# Patient Record
Sex: FEMALE | Race: WHITE | NOT HISPANIC OR LATINO
[De-identification: names, ages, dates, MRNs, and addresses within clinical notes are randomized per-mention and may not be internally consistent; named-entity substitution may affect disease eponyms.]

---

## 2017-03-01 ENCOUNTER — APPOINTMENT (OUTPATIENT)
Dept: PULMONOLOGY | Facility: CLINIC | Age: 67
End: 2017-03-01

## 2017-03-08 ENCOUNTER — APPOINTMENT (OUTPATIENT)
Dept: PULMONOLOGY | Facility: CLINIC | Age: 67
End: 2017-03-08

## 2017-03-15 ENCOUNTER — APPOINTMENT (OUTPATIENT)
Dept: PULMONOLOGY | Facility: CLINIC | Age: 67
End: 2017-03-15

## 2017-03-21 RX ORDER — BUPROPION HYDROCHLORIDE 150 MG/1
0 TABLET, EXTENDED RELEASE ORAL
Qty: 30 | Refills: 0 | COMMUNITY
Start: 2017-03-21

## 2017-03-29 ENCOUNTER — APPOINTMENT (OUTPATIENT)
Dept: PULMONOLOGY | Facility: CLINIC | Age: 67
End: 2017-03-29

## 2017-04-10 ENCOUNTER — APPOINTMENT (OUTPATIENT)
Dept: RHEUMATOLOGY | Facility: CLINIC | Age: 67
End: 2017-04-10

## 2017-04-10 ENCOUNTER — OTHER (OUTPATIENT)
Age: 67
End: 2017-04-10

## 2017-04-10 VITALS
RESPIRATION RATE: 16 BRPM | BODY MASS INDEX: 17.43 KG/M2 | TEMPERATURE: 98.4 F | HEIGHT: 68 IN | OXYGEN SATURATION: 96 % | WEIGHT: 115 LBS | HEART RATE: 86 BPM | SYSTOLIC BLOOD PRESSURE: 100 MMHG | DIASTOLIC BLOOD PRESSURE: 60 MMHG

## 2017-04-10 DIAGNOSIS — F41.9 ANXIETY DISORDER, UNSPECIFIED: ICD-10-CM

## 2017-04-10 RX ORDER — DENOSUMAB 60 MG/ML
60 INJECTION SUBCUTANEOUS
Qty: 1 | Refills: 0 | Status: COMPLETED | OUTPATIENT
Start: 2017-04-10

## 2017-04-10 RX ADMIN — DENOSUMAB 0 MG/ML: 60 INJECTION SUBCUTANEOUS at 00:00

## 2017-04-26 ENCOUNTER — OTHER (OUTPATIENT)
Age: 67
End: 2017-04-26

## 2017-04-26 DIAGNOSIS — R00.2 PALPITATIONS: ICD-10-CM

## 2017-04-26 DIAGNOSIS — Z00.00 ENCOUNTER FOR GENERAL ADULT MEDICAL EXAMINATION W/OUT ABNORMAL FINDINGS: ICD-10-CM

## 2017-05-11 RX ORDER — FLUTICASONE PROPIONATE 50 MCG
0 SPRAY, SUSPENSION NASAL
Qty: 16 | Refills: 0 | COMMUNITY
Start: 2017-05-11

## 2017-05-11 RX ORDER — FLUTICASONE PROPIONATE 50 MCG
2 SPRAY, SUSPENSION NASAL
Qty: 16 | Refills: 0 | COMMUNITY
Start: 2017-05-11

## 2017-05-18 RX ORDER — TRAZODONE HCL 50 MG
0 TABLET ORAL
Qty: 60 | Refills: 0 | COMMUNITY
Start: 2017-05-18

## 2017-05-30 RX ORDER — ALPRAZOLAM 0.25 MG
0 TABLET ORAL
Qty: 30 | Refills: 0 | COMMUNITY
Start: 2017-05-30

## 2017-05-31 RX ORDER — FLURAZEPAM HCL 15 MG
0 CAPSULE ORAL
Qty: 30 | Refills: 0 | COMMUNITY
Start: 2017-05-31

## 2017-06-11 RX ORDER — MONTELUKAST 4 MG/1
1 TABLET, CHEWABLE ORAL
Qty: 30 | Refills: 0 | COMMUNITY
Start: 2017-06-11

## 2017-06-11 RX ORDER — MONTELUKAST 4 MG/1
0 TABLET, CHEWABLE ORAL
Qty: 30 | Refills: 0 | COMMUNITY
Start: 2017-06-11

## 2017-06-12 ENCOUNTER — OTHER (OUTPATIENT)
Age: 67
End: 2017-06-12

## 2017-06-19 ENCOUNTER — INPATIENT (INPATIENT)
Facility: HOSPITAL | Age: 67
LOS: 2 days | Discharge: HOME | DRG: 93 | End: 2017-06-22
Attending: INTERNAL MEDICINE | Admitting: INTERNAL MEDICINE
Payer: MEDICARE

## 2017-06-19 VITALS
TEMPERATURE: 98 F | RESPIRATION RATE: 18 BRPM | HEART RATE: 72 BPM | SYSTOLIC BLOOD PRESSURE: 126 MMHG | OXYGEN SATURATION: 100 % | DIASTOLIC BLOOD PRESSURE: 76 MMHG

## 2017-06-19 DIAGNOSIS — R41.82 ALTERED MENTAL STATUS, UNSPECIFIED: ICD-10-CM

## 2017-06-19 LAB
APAP SERPL-MCNC: <15 UG/ML — SIGNIFICANT CHANGE UP (ref 10–30)
APPEARANCE UR: CLEAR — SIGNIFICANT CHANGE UP
APTT BLD: 30.7 SEC — SIGNIFICANT CHANGE UP (ref 27.5–37.4)
BASOPHILS # BLD AUTO: 0 K/UL — SIGNIFICANT CHANGE UP (ref 0–0.2)
BASOPHILS NFR BLD AUTO: 0.7 % — SIGNIFICANT CHANGE UP (ref 0–2)
BILIRUB UR-MCNC: NEGATIVE — SIGNIFICANT CHANGE UP
CK MB BLD-MCNC: 1.7 % — SIGNIFICANT CHANGE UP (ref 0–3.5)
CK MB CFR SERPL CALC: 1 NG/ML — SIGNIFICANT CHANGE UP (ref 0–3.8)
CK SERPL-CCNC: 60 U/L — SIGNIFICANT CHANGE UP (ref 25–170)
COLOR SPEC: YELLOW — SIGNIFICANT CHANGE UP
DIFF PNL FLD: NEGATIVE — SIGNIFICANT CHANGE UP
EOSINOPHIL # BLD AUTO: 0 K/UL — SIGNIFICANT CHANGE UP (ref 0–0.5)
EOSINOPHIL NFR BLD AUTO: 0.5 % — SIGNIFICANT CHANGE UP (ref 0–6)
ETHANOL SERPL-MCNC: SIGNIFICANT CHANGE UP MG/DL (ref 0–10)
GAS PNL BLDV: SIGNIFICANT CHANGE UP
GLUCOSE UR QL: 150
HCG SERPL-ACNC: 2.3 MIU/ML — SIGNIFICANT CHANGE UP
HCT VFR BLD CALC: 38.1 % — SIGNIFICANT CHANGE UP (ref 34.5–45)
HGB BLD-MCNC: 13.3 G/DL — SIGNIFICANT CHANGE UP (ref 11.5–15.5)
INR BLD: 1.08 RATIO — SIGNIFICANT CHANGE UP (ref 0.88–1.16)
KETONES UR-MCNC: ABNORMAL
LEUKOCYTE ESTERASE UR-ACNC: NEGATIVE — SIGNIFICANT CHANGE UP
LYMPHOCYTES # BLD AUTO: 0.7 K/UL — LOW (ref 1–3.3)
LYMPHOCYTES # BLD AUTO: 16.5 % — SIGNIFICANT CHANGE UP (ref 13–44)
MCHC RBC-ENTMCNC: 33.9 PG — SIGNIFICANT CHANGE UP (ref 27–34)
MCHC RBC-ENTMCNC: 34.9 GM/DL — SIGNIFICANT CHANGE UP (ref 32–36)
MCV RBC AUTO: 97.1 FL — SIGNIFICANT CHANGE UP (ref 80–100)
MONOCYTES # BLD AUTO: 0.3 K/UL — SIGNIFICANT CHANGE UP (ref 0–0.9)
MONOCYTES NFR BLD AUTO: 8 % — SIGNIFICANT CHANGE UP (ref 2–14)
NEUTROPHILS # BLD AUTO: 3.2 K/UL — SIGNIFICANT CHANGE UP (ref 1.8–7.4)
NEUTROPHILS NFR BLD AUTO: 74.3 % — SIGNIFICANT CHANGE UP (ref 43–77)
NITRITE UR-MCNC: NEGATIVE — SIGNIFICANT CHANGE UP
PCP SPEC-MCNC: SIGNIFICANT CHANGE UP
PH UR: 6 — SIGNIFICANT CHANGE UP (ref 5–8)
PLATELET # BLD AUTO: 113 K/UL — LOW (ref 150–400)
PROT UR-MCNC: NEGATIVE — SIGNIFICANT CHANGE UP
PROTHROM AB SERPL-ACNC: 11.7 SEC — SIGNIFICANT CHANGE UP (ref 9.8–12.7)
RBC # BLD: 3.92 M/UL — SIGNIFICANT CHANGE UP (ref 3.8–5.2)
RBC # FLD: 11.9 % — SIGNIFICANT CHANGE UP (ref 10.3–14.5)
RBC CASTS # UR COMP ASSIST: SIGNIFICANT CHANGE UP /HPF (ref 0–2)
SALICYLATES SERPL-MCNC: <2 MG/DL — LOW (ref 15–30)
SP GR SPEC: 1.01 — SIGNIFICANT CHANGE UP (ref 1.01–1.02)
TROPONIN T SERPL-MCNC: <0.01 NG/ML — SIGNIFICANT CHANGE UP (ref 0–0.06)
UROBILINOGEN FLD QL: NEGATIVE — SIGNIFICANT CHANGE UP
WBC # BLD: 4.3 K/UL — SIGNIFICANT CHANGE UP (ref 3.8–10.5)
WBC # FLD AUTO: 4.3 K/UL — SIGNIFICANT CHANGE UP (ref 3.8–10.5)

## 2017-06-19 PROCEDURE — 99291 CRITICAL CARE FIRST HOUR: CPT | Mod: GC

## 2017-06-19 PROCEDURE — 70450 CT HEAD/BRAIN W/O DYE: CPT | Mod: 26

## 2017-06-19 PROCEDURE — 99223 1ST HOSP IP/OBS HIGH 75: CPT

## 2017-06-19 RX ORDER — SODIUM CHLORIDE 9 MG/ML
1000 INJECTION INTRAMUSCULAR; INTRAVENOUS; SUBCUTANEOUS ONCE
Qty: 0 | Refills: 0 | Status: COMPLETED | OUTPATIENT
Start: 2017-06-19 | End: 2017-06-19

## 2017-06-19 RX ADMIN — Medication 1 MILLIGRAM(S): at 22:10

## 2017-06-19 RX ADMIN — SODIUM CHLORIDE 1000 MILLILITER(S): 9 INJECTION INTRAMUSCULAR; INTRAVENOUS; SUBCUTANEOUS at 21:30

## 2017-06-19 RX ADMIN — Medication 1 MILLIGRAM(S): at 21:00

## 2017-06-19 NOTE — ED PROVIDER NOTE - PROGRESS NOTE DETAILS
Spoke with Dr. Baldwin, tox, concern for OD of trazadonr and wellbutrin which can give serotonin syndrome, requests CPK and MG, offer ativan 1m IV now, can repeat dosing, admit to tele - ZR Dr Robles psych called [phone] called left massage on machine

## 2017-06-19 NOTE — ED ADULT NURSE NOTE - OBJECTIVE STATEMENT
66 year old female presented to ED via ems for ams today. Pts  found pt altered at home. following some commands, presents nonverbal, inappropriate laughing at times, and intermittent body jerking/twitching. PERRL 5 mm bilaterally, not following commands for EOM assessment, however, able to move eyes and follow HCP.  BGL obtained, EKG , and placed on cardiac monitor. Lab work performed and straight cathed for urine for urine specimen. Pt to go to CT for head CT.  denies recent fevers/chills/cough. Lungs auscultated, clear upon assessment. Does not guard when touching abdomen. No signs of pain. Skin w/d/i. 66 year old female presented to ED via ems for ams today. Pts  found pt altered at home. following some commands, presents nonverbal, inappropriate laughing at times, and intermittent body jerking/twitching. PERRL 5 mm bilaterally, not following commands for EOM assessment, however, able to move eyes and follow HCP.  BGL obtained, EKG , and placed on cardiac monitor. Lab work performed and straight cathed for urine for urine specimen. Pt to go to CT for head CT.  denies recent fevers/chills/cough. Lungs auscultated, clear upon assessment. Does not guard when touching abdomen. No signs of pain. Skin w/d/i. placed on constant observation upon getting back to room for safety due to pt trying to get out of bed.

## 2017-06-19 NOTE — ED PROVIDER NOTE - OBJECTIVE STATEMENT
66 year old female past medical history depression, who presents to the ED for AMS. Patient was found by her , who is a physician, at home about 1 hour prior to arrival. Patient was found to be altered, not answering questions, making jerking movemetns with extremities and arms. Patient had blood sugar of 50 by EMS given 1 amp D50. Patient has had no prior suicidal thoughts as per , follow up Dr. Robles psychiatry ([phone]). Takes trazadone, wellbutrin, ativan, and flurazapam. 66 year old female past medical history depression, who presents to the ED for AMS. Patient was found by her , who is a physician, at home about 1 hour prior to arrival. Patient was found to be altered, not answering questions, making jerking movements with extremities and arms. Patient had blood sugar of 50 by EMS given 1 amp D50. Patient has had no prior suicidal thoughts as per , follow up Dr. Robles psychiatry ([phone]). Takes trazadone, wellbutrin, ativan, and flurazapam. 66 year old female past medical history depression, who presents to the ED for AMS. Patient was found by her , who is a physician, at home about 1 hour prior to arrival. Patient was found to be altered, not answering questions, making jerking movements with extremities and arms. Patient had blood sugar of 50 by EMS given 1 amp D50. Patient has had no prior suicidal thoughts as per , follow up Dr. Robles psychiatry ([phone]). Takes trazadone, wellbutrin, ativan, and flurazepam.

## 2017-06-19 NOTE — ED PROVIDER NOTE - CRITICAL CARE PROVIDED
additional history taking/interpretation of diagnostic studies/direct patient care (not related to procedure)

## 2017-06-19 NOTE — ED PROVIDER NOTE - MEDICAL DECISION MAKING DETAILS
66 year old female, wife of Dr. Ayala, who presents from home today and was found altered by ,  with multiple jerking movements and low sugar by EMS. Patient has depression and anxiety. 102 year old mother who is getting Alzheimer's and patient is very upset and stressed. No other medical issues, takes trazadone, wellbutrin, ativan, flurazapam, never expressed SI or admitted for psychiatric issues in the past. She drinks alcohol occasionally but not heavy alcoholic, will obtain bloodwork,. drug screen, conversation with tox, EKG, CT head, and admission to telemetry - ZR

## 2017-06-19 NOTE — ED ADULT NURSE NOTE - PMH
<<----- Click to add NO pertinent Past Medical History No pertinent past medical history Mental health problem

## 2017-06-20 DIAGNOSIS — F05 DELIRIUM DUE TO KNOWN PHYSIOLOGICAL CONDITION: ICD-10-CM

## 2017-06-20 DIAGNOSIS — R41.82 ALTERED MENTAL STATUS, UNSPECIFIED: ICD-10-CM

## 2017-06-20 DIAGNOSIS — E87.6 HYPOKALEMIA: ICD-10-CM

## 2017-06-20 DIAGNOSIS — E16.2 HYPOGLYCEMIA, UNSPECIFIED: ICD-10-CM

## 2017-06-20 DIAGNOSIS — H26.9 UNSPECIFIED CATARACT: Chronic | ICD-10-CM

## 2017-06-20 DIAGNOSIS — F41.9 ANXIETY DISORDER, UNSPECIFIED: ICD-10-CM

## 2017-06-20 LAB
ALBUMIN SERPL ELPH-MCNC: 3.9 G/DL — SIGNIFICANT CHANGE UP (ref 3.3–5)
ALP SERPL-CCNC: 42 U/L — SIGNIFICANT CHANGE UP (ref 40–120)
ALT FLD-CCNC: 19 U/L — SIGNIFICANT CHANGE UP (ref 10–45)
ANION GAP SERPL CALC-SCNC: 15 MMOL/L — SIGNIFICANT CHANGE UP (ref 5–17)
AST SERPL-CCNC: 27 U/L — SIGNIFICANT CHANGE UP (ref 10–40)
BASOPHILS # BLD AUTO: 0 K/UL — SIGNIFICANT CHANGE UP (ref 0–0.2)
BASOPHILS NFR BLD AUTO: 0 % — SIGNIFICANT CHANGE UP (ref 0–2)
BILIRUB SERPL-MCNC: 0.3 MG/DL — SIGNIFICANT CHANGE UP (ref 0.2–1.2)
BUN SERPL-MCNC: 10 MG/DL — SIGNIFICANT CHANGE UP (ref 7–23)
CALCIUM SERPL-MCNC: 8.6 MG/DL — SIGNIFICANT CHANGE UP (ref 8.4–10.5)
CHLORIDE SERPL-SCNC: 102 MMOL/L — SIGNIFICANT CHANGE UP (ref 96–108)
CO2 SERPL-SCNC: 21 MMOL/L — LOW (ref 22–31)
CREAT SERPL-MCNC: 0.69 MG/DL — SIGNIFICANT CHANGE UP (ref 0.5–1.3)
CULTURE RESULTS: NO GROWTH — SIGNIFICANT CHANGE UP
EOSINOPHIL # BLD AUTO: 0.01 K/UL — SIGNIFICANT CHANGE UP (ref 0–0.5)
EOSINOPHIL NFR BLD AUTO: 0.1 % — SIGNIFICANT CHANGE UP (ref 0–6)
GLUCOSE SERPL-MCNC: 92 MG/DL — SIGNIFICANT CHANGE UP (ref 70–99)
HBA1C BLD-MCNC: 5.1 % — SIGNIFICANT CHANGE UP (ref 4–5.6)
HCT VFR BLD CALC: 40 % — SIGNIFICANT CHANGE UP (ref 34.5–45)
HGB BLD-MCNC: 13.5 G/DL — SIGNIFICANT CHANGE UP (ref 11.5–15.5)
IMM GRANULOCYTES NFR BLD AUTO: 0.1 % — SIGNIFICANT CHANGE UP (ref 0–1.5)
LYMPHOCYTES # BLD AUTO: 0.65 K/UL — LOW (ref 1–3.3)
LYMPHOCYTES # BLD AUTO: 7.9 % — LOW (ref 13–44)
MCHC RBC-ENTMCNC: 31.7 PG — SIGNIFICANT CHANGE UP (ref 27–34)
MCHC RBC-ENTMCNC: 33.8 GM/DL — SIGNIFICANT CHANGE UP (ref 32–36)
MCV RBC AUTO: 93.9 FL — SIGNIFICANT CHANGE UP (ref 80–100)
MONOCYTES # BLD AUTO: 0.58 K/UL — SIGNIFICANT CHANGE UP (ref 0–0.9)
MONOCYTES NFR BLD AUTO: 7 % — SIGNIFICANT CHANGE UP (ref 2–14)
NEUTROPHILS # BLD AUTO: 6.98 K/UL — SIGNIFICANT CHANGE UP (ref 1.8–7.4)
NEUTROPHILS NFR BLD AUTO: 84.9 % — HIGH (ref 43–77)
PLATELET # BLD AUTO: 139 K/UL — LOW (ref 150–400)
POTASSIUM SERPL-MCNC: 4.1 MMOL/L — SIGNIFICANT CHANGE UP (ref 3.5–5.3)
POTASSIUM SERPL-SCNC: 4.1 MMOL/L — SIGNIFICANT CHANGE UP (ref 3.5–5.3)
PROT SERPL-MCNC: 7.1 G/DL — SIGNIFICANT CHANGE UP (ref 6–8.3)
RBC # BLD: 4.26 M/UL — SIGNIFICANT CHANGE UP (ref 3.8–5.2)
RBC # FLD: 13.8 % — SIGNIFICANT CHANGE UP (ref 10.3–14.5)
SODIUM SERPL-SCNC: 138 MMOL/L — SIGNIFICANT CHANGE UP (ref 135–145)
SPECIMEN SOURCE: SIGNIFICANT CHANGE UP
TSH SERPL-MCNC: 1.17 UIU/ML — SIGNIFICANT CHANGE UP (ref 0.27–4.2)
TSH SERPL-MCNC: 2.16 UIU/ML — SIGNIFICANT CHANGE UP (ref 0.27–4.2)
WBC # BLD: 8.23 K/UL — SIGNIFICANT CHANGE UP (ref 3.8–10.5)
WBC # FLD AUTO: 8.23 K/UL — SIGNIFICANT CHANGE UP (ref 3.8–10.5)

## 2017-06-20 PROCEDURE — 93010 ELECTROCARDIOGRAM REPORT: CPT

## 2017-06-20 PROCEDURE — 99223 1ST HOSP IP/OBS HIGH 75: CPT | Mod: AI

## 2017-06-20 PROCEDURE — 99222 1ST HOSP IP/OBS MODERATE 55: CPT | Mod: GC

## 2017-06-20 PROCEDURE — 90792 PSYCH DIAG EVAL W/MED SRVCS: CPT

## 2017-06-20 PROCEDURE — 99223 1ST HOSP IP/OBS HIGH 75: CPT

## 2017-06-20 RX ORDER — BUPROPION HYDROCHLORIDE 150 MG/1
0 TABLET, EXTENDED RELEASE ORAL
Qty: 0 | Refills: 0 | COMMUNITY

## 2017-06-20 RX ORDER — SODIUM CHLORIDE 9 MG/ML
1000 INJECTION INTRAMUSCULAR; INTRAVENOUS; SUBCUTANEOUS
Qty: 0 | Refills: 0 | Status: COMPLETED | OUTPATIENT
Start: 2017-06-20 | End: 2017-06-20

## 2017-06-20 RX ORDER — POTASSIUM CHLORIDE 20 MEQ
10 PACKET (EA) ORAL
Qty: 0 | Refills: 0 | Status: COMPLETED | OUTPATIENT
Start: 2017-06-20 | End: 2017-06-20

## 2017-06-20 RX ORDER — TRAZODONE HCL 50 MG
0 TABLET ORAL
Qty: 0 | Refills: 0 | COMMUNITY

## 2017-06-20 RX ADMIN — SODIUM CHLORIDE 100 MILLILITER(S): 9 INJECTION INTRAMUSCULAR; INTRAVENOUS; SUBCUTANEOUS at 03:47

## 2017-06-20 RX ADMIN — Medication 1 MILLIGRAM(S): at 23:27

## 2017-06-20 RX ADMIN — Medication 100 MILLIEQUIVALENT(S): at 05:10

## 2017-06-20 RX ADMIN — Medication 100 MILLIEQUIVALENT(S): at 06:29

## 2017-06-20 RX ADMIN — Medication 100 MILLIEQUIVALENT(S): at 03:47

## 2017-06-20 NOTE — CONSULT NOTE ADULT - SUBJECTIVE AND OBJECTIVE BOX
Neurology Consult Note    Patient is a 66y old  Female who presents with a chief complaint of AMS x one day (20 Jun 2017 02:41)      The patient is a 66y Female with a history of ------ asked to evaluate the patient with a complaint of ----- symptoms began ----     MEDICATIONS  (STANDING):    MEDICATIONS  (PRN):  LORazepam     Tablet 1milliGRAM(s) Oral every 2 hours PRN Agitation      PAST MEDICAL & SURGICAL HISTORY:  Osteoporosis  Anxiety  Mental health problem  No pertinent past medical history  Acquired cataract  No significant past surgical history      FAMILY HISTORY:  Family history of hypertension (Father)      Allergies    No Known Allergies    Intolerances        SOCIAL HISTORY:  type ~  then ?H&P    REVIEW OF SYSTEMS  General:(-),Skin/Breast:(-),Ophthalmologic:(-),ENMT:(-),Respiratory and Thorax:(-),Cardiovascular:(-),Gastrointestinal:(-),Genitourinary:(-),Musculoskeletal:(-),Psychiatric:(-),Hematology/Lymphatics:(-),Endocrine:(-),Allergic/Immunologic:(-)    Vital Signs Last 24 Hrs  T(C): 36.5, Max: 36.5 (06-20 @ 07:15)  T(F): 97.7, Max: 97.7 (06-20 @ 07:15)  HR: 79 (72 - 79)  BP: 123/76 (123/76 - 139/77)  BP(mean): --  RR: 18 (16 - 18)  SpO2: 95% (95% - 100%)    Physical exam  GENERAL-WDWN  CARDIOVASCULAR- Cor- RRR s MGR, carotid- 2+, No bruits  EYES- non-icteric disks obscured  MENTAL STATUS- Alert, attends, fluent, non-dysarthric, follows commands, oriented, good memory and affect.  CRANIAL NERVES-II Optic - Bilateral - Normal Visual Fields. III-IV-VI EOMI & Pupils - Bilateral - Normal Bilaterally. V Trigeminal - Bilateral - Normal bilateral facial sensation and jaw movement. VII Facial - Normal bilateral facial movement. VIII Acoustic - Bilateral - Hearing normal to voice bilaterally. IX Glossopharyngeal / X Vagus - Normal palate elevates symmetrically. XI Accessory - Normal Bilaterally. XII Hypoglossal - Tongue protrudes midline and moves symmetrically.  MOTOR-Bulk and Contour - Normal. Tone - Normal tone, no abnormal movements. Strength - 5/5 normal muscle strength - Strength full throughout.  SENSORY-Normal - LT, DSS, Vibration.  REFLEXES- DTR's 2+ throughout.  COORDINATION-Normal FFM, XIOMARA, FNF - bilaterally.  GAIT-Normal base, heel, toe, tandem.    CBC Full  -  ( 20 Jun 2017 07:24 )  WBC Count : 8.23 K/uL  Hemoglobin : 13.5 g/dL  Hematocrit : 40.0 %  Platelet Count - Automated : 139 K/uL  Mean Cell Volume : 93.9 fl  Mean Cell Hemoglobin : 31.7 pg  Mean Cell Hemoglobin Concentration : 33.8 gm/dL  Auto Neutrophil # : 6.98 K/uL  Auto Lymphocyte # : 0.65 K/uL  Auto Monocyte # : 0.58 K/uL  Auto Eosinophil # : 0.01 K/uL  Auto Basophil # : 0.00 K/uL  Auto Neutrophil % : 84.9 %  Auto Lymphocyte % : 7.9 %  Auto Monocyte % : 7.0 %  Auto Eosinophil % : 0.1 %  Auto Basophil % : 0.0 %      06-20    138  |  102  |  10  ----------------------------<  92  4.1   |  21<L>  |  0.69    Ca    8.6      20 Jun 2017 07:24  Phos  2.4     06-19  Mg     2.1     06-19    TPro  7.1  /  Alb  3.9  /  TBili  0.3  /  DBili  x   /  AST  27  /  ALT  19  /  AlkPhos  42  06-20    LIVER FUNCTIONS - ( 20 Jun 2017 07:24 )  Alb: 3.9 g/dL / Pro: 7.1 g/dL / ALK PHOS: 42 U/L / ALT: 19 U/L / AST: 27 U/L / GGT: x               CTbr-No acute intracranial hemorrhage, mass effect, or CT evidence of an acute   vascular territorial infarct.    Minimal chronic ischemic changes in thefrontoparietal white matter. Neurology Consult Note    Patient is a 66y old  Female who presents with a chief complaint of AMS x one day (20 Jun 2017 02:41)      The patient is a 66y Female with a history of -psych ilness asked to evaluate the patient with a complaint of  confusion symptoms began -1-2 days ago    MEDICATIONS  (STANDING):    MEDICATIONS  (PRN):  LORazepam     Tablet 1milliGRAM(s) Oral every 2 hours PRN Agitation      PAST MEDICAL & SURGICAL HISTORY:  Osteoporosis  Anxiety  Mental health problem  No pertinent past medical history  Acquired cataract  No significant past surgical history      FAMILY HISTORY:  Family history of hypertension (Father)      Allergies    No Known Allergies    Intolerances        SOCIAL HISTORY:  type ~  then ?H&P    REVIEW OF SYSTEMS  General:(-),Skin/Breast:(-),Ophthalmologic:(-),ENMT:(-),Respiratory and Thorax:(-),Cardiovascular:(-),Gastrointestinal:(-),Genitourinary:(-),Musculoskeletal:(-),Psychiatric:(-),Hematology/Lymphatics:(-),Endocrine:(-),Allergic/Immunologic:(-)    Vital Signs Last 24 Hrs  T(C): 36.5, Max: 36.5 (06-20 @ 07:15)  T(F): 97.7, Max: 97.7 (06-20 @ 07:15)  HR: 79 (72 - 79)  BP: 123/76 (123/76 - 139/77)  BP(mean): --  RR: 18 (16 - 18)  SpO2: 95% (95% - 100%)    Physical exam  GENERAL-WDWN  CARDIOVASCULAR- Cor- RRR s MGR, carotid- 2+, No bruits  EYES- non-icteric disks obscured  MENTAL STATUS- Alert, attends, fluent, non-dysarthric, follows commands, oriented, good memory and affect.  CRANIAL NERVES-II Optic - Bilateral - Normal Visual Fields. III-IV-VI EOMI & Pupils - Bilateral - Normal Bilaterally. V Trigeminal - Bilateral - Normal bilateral facial sensation and jaw movement. VII Facial - Normal bilateral facial movement. VIII Acoustic - Bilateral - Hearing normal to voice bilaterally. IX Glossopharyngeal / X Vagus - Normal palate elevates symmetrically. XI Accessory - Normal Bilaterally. XII Hypoglossal - Tongue protrudes midline and moves symmetrically.  MOTOR-Bulk and Contour - Normal. Tone - Normal tone, no abnormal movements. Strength - 5/5 normal muscle strength - Strength full throughout.  SENSORY-Normal - LT, DSS, Vibration.  REFLEXES- DTR's 2+ throughout.  COORDINATION-Normal FFM, XIOMARA, FNF - bilaterally.  GAIT-Normal base, heel, toe, tandem.    CBC Full  -  ( 20 Jun 2017 07:24 )  WBC Count : 8.23 K/uL  Hemoglobin : 13.5 g/dL  Hematocrit : 40.0 %  Platelet Count - Automated : 139 K/uL  Mean Cell Volume : 93.9 fl  Mean Cell Hemoglobin : 31.7 pg  Mean Cell Hemoglobin Concentration : 33.8 gm/dL  Auto Neutrophil # : 6.98 K/uL  Auto Lymphocyte # : 0.65 K/uL  Auto Monocyte # : 0.58 K/uL  Auto Eosinophil # : 0.01 K/uL  Auto Basophil # : 0.00 K/uL  Auto Neutrophil % : 84.9 %  Auto Lymphocyte % : 7.9 %  Auto Monocyte % : 7.0 %  Auto Eosinophil % : 0.1 %  Auto Basophil % : 0.0 %      06-20    138  |  102  |  10  ----------------------------<  92  4.1   |  21<L>  |  0.69    Ca    8.6      20 Jun 2017 07:24  Phos  2.4     06-19  Mg     2.1     06-19    TPro  7.1  /  Alb  3.9  /  TBili  0.3  /  DBili  x   /  AST  27  /  ALT  19  /  AlkPhos  42  06-20    LIVER FUNCTIONS - ( 20 Jun 2017 07:24 )  Alb: 3.9 g/dL / Pro: 7.1 g/dL / ALK PHOS: 42 U/L / ALT: 19 U/L / AST: 27 U/L / GGT: x               CTbr- my opininion L parietal lucency but official---No acute intracranial hemorrhage, mass effect, or CT evidence of an acute   vascular territorial infarct.    Minimal chronic ischemic changes in thefrontoparietal white matter. Neurology Consult Note    Patient is a 66y old  Female who presents with a chief complaint of AMS x one day (20 Jun 2017 02:41)      The patient is a 66y Female with a history of -psych illness asked to evaluate the patient with a complaint of  confusion symptoms began -1-2 days ago, some twitching, no c/o lateralized weakness, sensory or visual loss- since improved but cotniues    MEDICATIONS  (STANDING):    MEDICATIONS  (PRN):  LORazepam     Tablet 1milliGRAM(s) Oral every 2 hours PRN Agitation      PAST MEDICAL & SURGICAL HISTORY:  Osteoporosis  Anxiety  Mental health problem  No pertinent past medical history  Acquired cataract  No significant past surgical history      FAMILY HISTORY:  Family history of hypertension (Father)      Allergies    No Known Allergies    Intolerances        SOCIAL HISTORY:  type ~  then ?H&P    REVIEW OF SYSTEMS  General:(-),Skin/Breast:(-),Ophthalmologic:(-),ENMT:(-),Respiratory and Thorax:(-),Cardiovascular:(-),Gastrointestinal:(-),Genitourinary:(-),Musculoskeletal:(-),Psychiatric:(-),Hematology/Lymphatics:(-),Endocrine:(-),Allergic/Immunologic:(-)    Vital Signs Last 24 Hrs  T(C): 36.5, Max: 36.5 (06-20 @ 07:15)  T(F): 97.7, Max: 97.7 (06-20 @ 07:15)  HR: 79 (72 - 79)  BP: 123/76 (123/76 - 139/77)  BP(mean): --  RR: 18 (16 - 18)  SpO2: 95% (95% - 100%)    Physical exam  GENERAL-WDWN  CARDIOVASCULAR- Cor- RRR s MGR, carotid- 2+, No bruits  EYES- non-icteric disks obscured  MENTAL STATUS- Alert, attends, fluent at times, non-dysarthric, follows simple commands, slow to answer talks much of feeling "crazy" and a "fraud"  CRANIAL NERVES-II Optic - Bilateral - Normal Visual Fields. III-IV-VI EOMI & Pupils - Bilateral - Normal Bilaterally. V Trigeminal - Bilateral - Normal bilateral facial sensation and jaw movement. VII Facial - Normal bilateral facial movement. VIII Acoustic - Bilateral - Hearing normal to voice bilaterally. IX Glossopharyngeal / X Vagus - Normal palate elevates symmetrically. XI Accessory - Normal Bilaterally. XII Hypoglossal - Tongue protrudes midline and moves symmetrically.  MOTOR-Bulk and Contour - Normal. Tone - Normal tone, no abnormal movements. Strength - 5/5 normal muscle strength - Strength full throughout.  SENSORY-Normal - LT, DSS, Vibration.  REFLEXES- DTR's 2+ throughout.  COORDINATION-Normal FFM, XIOMARA, FNF - bilaterally.  GAIT-Normal base, heel, toe, tandem.    CBC Full  -  ( 20 Jun 2017 07:24 )  WBC Count : 8.23 K/uL  Hemoglobin : 13.5 g/dL  Hematocrit : 40.0 %  Platelet Count - Automated : 139 K/uL  Mean Cell Volume : 93.9 fl  Mean Cell Hemoglobin : 31.7 pg  Mean Cell Hemoglobin Concentration : 33.8 gm/dL  Auto Neutrophil # : 6.98 K/uL  Auto Lymphocyte # : 0.65 K/uL  Auto Monocyte # : 0.58 K/uL  Auto Eosinophil # : 0.01 K/uL  Auto Basophil # : 0.00 K/uL  Auto Neutrophil % : 84.9 %  Auto Lymphocyte % : 7.9 %  Auto Monocyte % : 7.0 %  Auto Eosinophil % : 0.1 %  Auto Basophil % : 0.0 %      06-20    138  |  102  |  10  ----------------------------<  92  4.1   |  21<L>  |  0.69    Ca    8.6      20 Jun 2017 07:24  Phos  2.4     06-19  Mg     2.1     06-19    TPro  7.1  /  Alb  3.9  /  TBili  0.3  /  DBili  x   /  AST  27  /  ALT  19  /  AlkPhos  42  06-20    LIVER FUNCTIONS - ( 20 Jun 2017 07:24 )  Alb: 3.9 g/dL / Pro: 7.1 g/dL / ALK PHOS: 42 U/L / ALT: 19 U/L / AST: 27 U/L / GGT: x               CTbr- my opininion L parietal lucency but official---No acute intracranial hemorrhage, mass effect, or CT evidence of an acute   vascular territorial infarct.    Minimal chronic ischemic changes in thefrontoparietal white matter.

## 2017-06-20 NOTE — CONSULT NOTE ADULT - ATTENDING COMMENTS
Patient presents with confusion, appears to be improving while in hospital, on my exam: awake, alert, ambulatory, slow to answer questions.  It is unclear if symptoms are due to medications or underlying behavioural health issues. Myron Modi MD (attending)
Patient seen and examined with neurology team and above note reviewed and I agree with assessment and plan as outlined. Patient admitted for acute mental status change and delirium and found to have urine tox positive for amphetamines and benzos. She also appears to be acutely delusional and disoriented. Continue psych and medical management and no need for EEG or other neurologic testing at this time. Continue supportive care and supervision.

## 2017-06-20 NOTE — H&P ADULT - NSHPSOCIALHISTORY_GEN_ALL_CORE
Social History:    Marital Status:  (  x )    (   ) Single    (   )    (  )   Occupation:   Lives with: (  ) alone  ( x ) children   ( x ) spouse   (  ) parents  (  ) other    Substance Use (street drugs): (  ) never used  ( x ) other:  Tobacco Usage:  (   ) never smoked   (  x ) former smoker   (   ) current smoker  (     ) pack year  (        ) last cigarette date  Alcohol Usage: no etoh use

## 2017-06-20 NOTE — CONSULT NOTE ADULT - SUBJECTIVE AND OBJECTIVE BOX
MEDICAL TOXICOLOGY CONSULT    HPI:  Patient is unable to provide history, family unavailable at time of history.  Presenting history Obtained via discussion with Emergency room provider and Nursing staff.     66 year old female with a past medical history anxiety and depression, presents for altered mental state. Patient found by , Dr. Ayala, unable to communicate and making jerking movements.  Patient was not able to answer questions. When EMS arrived , they found patients blood sugar to be 50 and she was given 1 amp D50, per family patient has no history of suicidal ideation or attempts. Per , patient has had a considerable amount of stress in her life including her mother developing progressive dementia.     Chart reviewed (2017 02:41)      ONSET / TIME of exposure(s):    QUANTITY of exposure(s):    ROUTE of exposure:  Unknown     CONTEXT of exposure: Found at home, no history of ingestion    ASSOCIATED symptoms: AMS, myoclonic jerking    PAST MEDICAL & SURGICAL HISTORY:  Osteoporosis  Anxiety  Mental health problem  No pertinent past medical history  Acquired cataract  No significant past surgical history        MEDICATION HISTORY:    RECREATIONAL / ETHANOL / SUPPLEMENT USE:    SOCIAL Hx:  ETOH use daily 1-2 glasses of wine, denies tobacco or drug use     FAMILY HISTORY:  Family history of hypertension (Father)      REVIEW OF SYSTEMS:  Unable to perform due to intoxication, dementia, or illness    PHYSICAL EXAM  Vital Signs Last 24 Hrs  T(C): 36.5, Max: 36.5 (06-20 @ 07:15)  T(F): 97.7, Max: 97.7 (06-20 @ 07:15)  HR: 79 (72 - 79)  BP: 123/76 (123/76 - 139/77)  RR: 18 (16 - 18)  SpO2: 95% (95% - 100%)  General:    Head:  normocephalic & atraumatic  Eyes:  extra-occular movement is intact  Pupils: 3 mm, symmetric, reactive to light  Ear, nose, throat:  mucosa is moist dentition is intact  Neck:  supple  Respiratory:  Clear respiratory effort, clear to auscultation bilaterally, no rales/rhonchi/wheezing  Cardiac:  rate is normal, normal rhythm, no rubs/murmurs/gallops  Abdomen:  Soft, nondistended, nontender, +bowel sounds, no organomegaly  :  deferred  Skin:  dry/diaphoretic, pink/flushed  Neurologic:  (-) Clonus, (-) Tremor, Reflexes are (+) 2 lower extremities, extremities are supple, rigid, cranial nerves II-XII intact, Level of consciousness awake but intermittently answering questions   Psychiatric:  Insight is impaired, Alert, oriented x3, Memory is intact, Affect is flat. Intermittently answering questions, attempting to get out of bed incessantly    SIGNIFICANT LABORATORY STUDIES:                        13.5   8.23  )-----------( 139      ( 2017 07:24 )             40.0       06-20    138  |  102  |  10  ----------------------------<  92  4.1   |  21<L>  |  0.69    Ca    8.6      2017 07:24  Phos  2.4       Mg     2.1         TPro  7.1  /  Alb  3.9  /  TBili  0.3  /  DBili  x   /  AST  27  /  ALT  19  /  AlkPhos  42  -20      PT/INR - ( 2017 21:10 )   PT: 11.7 sec;   INR: 1.08 ratio         PTT - ( 2017 21:10 )  PTT:30.7 sec    Urinalysis Basic - ( 2017 21:21 )    Color: Yellow / Appearance: Clear / S.015 / pH: x  Gluc: x / Ketone: Small  / Bili: Negative / Urobili: Negative   Blood: x / Protein: Negative / Nitrite: Negative   Leuk Esterase: Negative / RBC: 0-2 /HPF / WBC x   Sq Epi: x / Non Sq Epi: x / Bacteria: x    Anion Gap: 15 06-20 @ 07:24  CK: 60 - @ 21:10  APAP <15, ASA <5, ETOH <10  VB  06-19 @ 21:10      EC rate, normal sinus rhythm, 100 QRS, 505 QTc    RADIOLOGIC STUDIES: CT head: See radiology report

## 2017-06-20 NOTE — BEHAVIORAL HEALTH ASSESSMENT NOTE - RISK ASSESSMENT
In her protective factors, her psychiatrist denies pt having history of substance use, and also reports pt has supportive family, no prior suicide attempts/ideation, pt currently denies she overdoses or is suicidal, though her mental status is currently altered, calling into question her veracity. In her risk factors, cannot currently rule out substance overdose.

## 2017-06-20 NOTE — CONSULT NOTE ADULT - ASSESSMENT
65 y/o woman p/w AMS x 1 day, initially hypoglycemic, with urine toxicology positive for amphetamine and benzodiazepine. No focal deficits on neurologic exam, though pt noted to be agitated and tremulous with fluctuating mental status. Symptoms are most consistent with toxic encephalopathy, intoxication vs withdrawal.
65 yo f with history of anxiety and depression presents with myoclonic jerking and altered mental status. No history of ingestion. K 3.3 with EKG of qtc of 505. As the patient is now 15 hours out of arrival to the ED with negative toxicology labs, unlikely to be toxicologic in nature. She does have an ETOH history but no signs of withdrawal including tremulousness or hemodynamic instability. Given history of a lot of stress and prior history of anxiety and depression likely related to current clinical condition. At this point, I would recommend repeating the EKG- if the Qtc is the same or improved, the patient may be cleared. Discussed with attending and team. Page for questions at .
She is a 66 year old with acute change in mental status and possible stroke on CT. Prolonged QT has resolved and her mental state is concerning for a neurologic event vs. medication related.  Agree with tele monitoring  repeat ECG daily.  Care reviewed with NP, Nurse, Pt's  and Dr. De La Garza.
F h/o psych illness and her exam is suggestive of this and toxicity/withdrawal ?improving BUT CT (my opinion) suggests L parietal lucency  NEuro chks  MRI Brain  psych eval  further w/u p above

## 2017-06-20 NOTE — BEHAVIORAL HEALTH ASSESSMENT NOTE - HPI (INCLUDE ILLNESS QUALITY, SEVERITY, DURATION, TIMING, CONTEXT, MODIFYING FACTORS, ASSOCIATED SIGNS AND SYMPTOMS)
Pt is a 66F with a past psychiatric history of anxiety and depression who presents with AMS x 1 day. Pt was found by her ; not communicating and tremulousness, with twitching of her extremities. EMS found pt to have blood glucose of 50 and administered 1 amp D50.     Pt was seen at bedside and was calm but tremulous and very confused. She is alert but not communicating and stares when asked questions.    Pt follows with outpt psychiatrist (Dr. Robles- [phone]). She currently takes trazodone, wellbutrin, lorazepam, and flurazepam. Pt could not communicate whether she has been taking her prescribed dosage of medications.    Collateral information not available at this time. Called  and Dr. Robles and left message. Pt is a 66F with a past psychiatric history of anxiety and depression who presents with AMS x 1 day. Pt was found by her ; not communicating and tremulousness, with twitching of her extremities. EMS found pt to have blood glucose of 50 and administered 1 amp D50.     Pt was seen at bedside and was calm but tremulous and very confused. She is alert but not communicating and stares when asked questions.    Pt follows with outpt psychiatrist (Dr. Robles- [phone]) for the past few years. As per Dr. Robles, pt Xanax 1 mg prn, flurazepam, alprazolam 1 mg. He has not taken antidepressants for a while. Pt could not communicate whether she has been taking her prescribed dosage of medications. Pt's psychiatrist states that he last saw her 1 week ago, and she was at her baseline which he describes as "bright," "outspoken," and "sophisticated. States that he has never seen her in this current state of confusion. He states that she has no history of abuse of her prescribed medications or any other substances of abuse. Pt has no past history of suicidal ideations or attempts. She has never been hospitalized for psychiatric reasons.     Collateral information from family not available at this time. Called  and left message. Pt is a 66  F with a past psychiatric history of anxiety and depression, no prior psych hospitalizations or suicide attempts, currently in psych treatment with Dr Robles, who presents with AMS x 1 day. Pt was found by her ; not communicating and tremulousness, with twitching of her extremities. EMS found pt to have blood glucose of 50 and administered 1 amp D50.     Pt was seen at bedside where she was found to be sitting up with legs off stretcher in somewhat odd posture. Pt was tremulous and confused - Able to give year with some difficulty ("19... no 2017"), not able to day, date or month, able to give name of hospital. She is alert but not communicating and stares when asked questions - unclear if responding to internal stimuli - pt denied hallucinations. Pt made some statements: "I made a mistake..." and some mention of "all of this over a molecule" - but unable to clarify. Denied overdosing on her medication or being suicidal. Says she takes Dalmane but couldn't say how much. Pt terminated interview, trying to climb out of bed to go to bathroom despite being told that she could not, had to be gently redirected by writer and PCA.    Pt follows with outpt psychiatrist (Dr. Robles- [phone]) for the past few years. As per Dr. Robles, pt Xanax 1 mg prn, flurazepam, alprazolam 1 mg. He has not taken antidepressants for a while. Pt could not communicate whether she has been taking her prescribed dosage of medications. Pt's psychiatrist states that he last saw her 1 week ago, and she was at her baseline which he describes as "bright," "outspoken," and "sophisticated. States that he has never seen her in this current state of confusion. He states that she has no history of abuse of her prescribed medications or any other substances of abuse. Pt has no past history of suicidal ideations or attempts. She has never been hospitalized for psychiatric reasons.     Collateral information from family not available at this time. Called  and left message.

## 2017-06-20 NOTE — H&P ADULT - PROBLEM SELECTOR PLAN 1
Utox positive for benzodiazapine and amphetamines. suspect presentation related to intoxication and possible overdose specifically to amphetamines, unclear if intentional,  QTC prolonged, will monitor on telemetry and trend electrolytes , can use ativan PRN for agitation, patients psychiatrist and toxicology have been notified by emergency team.  If mental status persists would obtain neurology input and possible LP   - Psychiatry consult - to be followed up by day team   - F/u toxicology consult - to be followed up by day team   - PRN Ativan for agitation   - [facility] protocol   - monitor on telemetry   -trend electrolytes  - f/u TSH  - constant observation Utox positive for benzodiazapine and amphetamines. suspect presentation related to intoxication and possible overdose specifically to amphetamines, unclear if intentional,  QTC prolonged, will monitor on telemetry and trend electrolytes , can use ativan PRN for agitation, patients psychiatrist and toxicology have been notified by emergency team.  Will obtain Neurology consult.   - Psychiatry consult - to be followed up by day team   - F/u toxicology consult - to be followed up by day team   - PRN Ativan for agitation   - [facility] protocol   - monitor on telemetry   -trend electrolytes  - f/u TSH  - constant observation  - Neurology consult

## 2017-06-20 NOTE — H&P ADULT - NSHPLABSRESULTS_GEN_ALL_CORE
Labs personally reviewed:                          13.3   4.3   )-----------( 113      ( 2017 21:10 )             38.1     06-    137  |  101  |  17  ----------------------------<  227<H>  3.3<L>   |  22  |  0.85    Ca    8.6      2017 21:10  Phos  2.4     -  Mg     2.1     -    TPro  6.5  /  Alb  3.9  /  TBili  0.3  /  DBili  x   /  AST  20  /  ALT  20  /  AlkPhos  43  -    CARDIAC MARKERS ( 2017 21:10 )  x     / <0.01 ng/mL / 60 U/L / x     / 1.0 ng/mL      LIVER FUNCTIONS - ( 2017 21:10 )  Alb: 3.9 g/dL / Pro: 6.5 g/dL / ALK PHOS: 43 U/L / ALT: 20 U/L RC / AST: 20 U/L / GGT: x           PT/INR - ( 2017 21:10 )   PT: 11.7 sec;   INR: 1.08 ratio         PTT - ( 2017 21:10 )  PTT:30.7 sec  Urinalysis Basic - ( 2017 21:21 )    Color: Yellow / Appearance: Clear / S.015 / pH: x  Gluc: x / Ketone: Small  / Bili: Negative / Urobili: Negative   Blood: x / Protein: Negative / Nitrite: Negative   Leuk Esterase: Negative / RBC: 0-2 /HPF / WBC x   Sq Epi: x / Non Sq Epi: x / Bacteria: x      CAPILLARY BLOOD GLUCOSE  210 (2017 20:45)      Imaging:  CTH personally reviewed , no acute hemorrhage or large infarct, no mass effect or edema,     EKG personally reviewed: nsr,  84 bpm , no acute s-t segment changes , qtc 505

## 2017-06-20 NOTE — BEHAVIORAL HEALTH ASSESSMENT NOTE - NSBHCHARTREVIEWLAB_PSY_A_CORE FT
13.5   8.23  )-----------( 139      ( 2017 07:24 )             40.0   06-20    138  |  102  |  10  ----------------------------<  92  4.1   |  21<L>  |  0.69    Ca    8.6      2017 07:24  Phos  2.4     -  Mg     2.1         TPro  7.1  /  Alb  3.9  /  TBili  0.3  /  DBili  x   /  AST  27  /  ALT  19  /  AlkPhos  42  -20    Thyroid Stimulating Hormone, Serum (17 @ 07:19)    Thyroid Stimulating Hormone, Serum: 2.16 uIU/mL    PT/INR - ( 2017 21:10 )   PT: 11.7 sec;   INR: 1.08 ratio       Urinalysis Basic - ( 2017 21:21 )    Color: Yellow / Appearance: Clear / S.015 / pH: x  Gluc: x / Ketone: Small  / Bili: Negative / Urobili: Negative   Blood: x / Protein: Negative / Nitrite: Negative   Leuk Esterase: Negative / RBC: 0-2 /HPF / WBC x   Sq Epi: x / Non Sq Epi: x / Bacteria: x      PTT - ( 2017 21:10 )  PTT:30.7 sec    Blood Gas Venous - Hemoglobin/Hematocrit (17 @ 21:10)    Total Hemoglobin, Calculated: 14.9 g/dL    Hematocrit, Calculated: 46 %    Blood Gas Profile - Venous (17 @ 21:10)    pH, Venous: 7.39    pCO2, Venous: 42 mmHg    pO2, Venous: 61 mmHg    HCO3, Venous: 25 mmol/L    Base Excess, Venous: 0.3 mmol/L    Oxygen Saturation, Venous: 89 %    Total CO2, Venous: 26 mmol/L    Blood Gas Source Venous: Venous

## 2017-06-20 NOTE — BEHAVIORAL HEALTH ASSESSMENT NOTE - PROBLEM SELECTOR PLAN 1
1:1 for AMS/falls risk. Pt currently denies SI, but unreliable  CIWA to r/o benzo withdrawal, with Ativan 1 mg q2hr prn CIWA score increase of 2 points or CIWA score greater than 8 mg. Will hold off standing benzo taper as pt already seems confused.  Thiamine 100 mg PO daily, Folic acid 1 mg po daily, MVI 1 tab PO daily.  Follow EKG

## 2017-06-20 NOTE — BEHAVIORAL HEALTH ASSESSMENT NOTE - NSBHCHARTREVIEWINVESTIGATE_PSY_A_CORE FT
Ventricular Rate 84 BPM    Atrial Rate 84 BPM    P-R Interval 178 ms    QRS Duration 100 ms     ms    QTc 505 ms    P Axis 84 degrees    R Axis 89 degrees    T Axis 76 degrees    Diagnosis Line NORMAL SINUS RHYTHM  PROLONGED QT  ABNORMAL ECG

## 2017-06-20 NOTE — BEHAVIORAL HEALTH ASSESSMENT NOTE - NSBHREFERDETAILS_PSY_A_CORE_FT
Consulted requested because pt with a history of anxiety, depression thought to have overdosed on benzodiazepines or amphetamines

## 2017-06-20 NOTE — CONSULT NOTE ADULT - SUBJECTIVE AND OBJECTIVE BOX
Neurology Consult Note      HPI:  Patient is unable to provide history, family unavailable at time of history.  Presenting history Obtained via discussion with Emergency room provider and Nursing staff.   66 year old woman with a past medical history anxiety and depression p/w AMS x 1 day. Today pt was found by her , unable to communicate and making jerking movements, found an hour prior to arrival but unclear last normal. FS was 50 and pt s/p 1 amp D50. Takes trazadone, wellbutrin, ativan, and flurazepam as home meds.  In ER, pt found to have positive Utox for benzodiazepine and amphetamine, with fluctuating mental status per RN, occasionally following simple commands and verbal and at other times not responsive.     Review of Systems: Unable to obtain as pt not responding to questions    PMH: As above  Meds: As above  All: NKDA  SH: Unknown  FH: Unknown    Objective:   Vital Signs Last 24 Hrs  T(C): 36.3, Max: 36.4 (06-19 @ 21:30)  T(F): 97.4, Max: 97.5 (06-19 @ 21:30)  HR: 77 (72 - 77)  BP: 139/77 (126/76 - 139/77)  BP(mean): --  RR: 16 (16 - 18)  SpO2: 99% (98% - 100%)    General Exam:   General appearance: Patient appears agitated, restless, moving frequently in stretcher         Neurological Exam:  Mental Status: Awake and alert, mimics few simple commands, only spoke one sentence fluently, does not repeat or name.     Cranial Nerves: Pupils 6 mm, round, reactive to light, EOMI, BTT b/l, face symmetric, no obvious dysarthria    Motor:   Tone: Normal             Strength: UEs minimum 3/5 b/l, LEs no drift though difficult to assess completely 2/2 patient cooperation               Tremor: + Occasional high amplitude and high frequency action tremor    Sensation: Appears intact b/l LEs (though difficult to assess 2/2 patient cooperation)    Deep Tendon Reflexes: 2+ b/l patellas, UEs 3+ throughout    Toes: Downgoing    Other:    06-19    137  |  101  |  17  ----------------------------<  227<H>  3.3<L>   |  22  |  0.85    Ca    8.6      19 Jun 2017 21:10  Phos  2.4     06-19  Mg     2.1     06-19    TPro  6.5  /  Alb  3.9  /  TBili  0.3  /  DBili  x   /  AST  20  /  ALT  20  /  AlkPhos  43  06-19    06-19    137  |  101  |  17  ----------------------------<  227<H>  3.3<L>   |  22  |  0.85    Ca    8.6      19 Jun 2017 21:10  Phos  2.4     06-19  Mg     2.1     06-19    TPro  6.5  /  Alb  3.9  /  TBili  0.3  /  DBili  x   /  AST  20  /  ALT  20  /  AlkPhos  43  06-19    LIVER FUNCTIONS - ( 19 Jun 2017 21:10 )  Alb: 3.9 g/dL / Pro: 6.5 g/dL / ALK PHOS: 43 U/L / ALT: 20 U/L RC / AST: 20 U/L / GGT: x             Radiology    EKG:  tele:  TTE:  EEG: Neurology Consult Note      HPI:  Patient is unable to provide history, family unavailable at time of history.  Presenting history Obtained via discussion with Emergency room provider and Nursing staff.   66 year old woman with a past medical history anxiety and depression p/w AMS x 1 day. Today pt was found by her , unable to communicate and making jerking movements, found an hour prior to arrival but unclear last normal. FS was 50 and pt s/p 1 amp D50. Takes trazadone, wellbutrin, ativan, and flurazepam as home meds.  In ER, pt found to have positive Utox for benzodiazepine and amphetamine, with fluctuating mental status per RN, occasionally following simple commands and verbal and at other times not responsive.     Review of Systems: Unable to obtain as pt not responding to questions    PMH: As above  Meds: As above  All: NKDA  SH: Unknown  FH: Unknown    Objective:   Vital Signs Last 24 Hrs  T(C): 36.3, Max: 36.4 (06-19 @ 21:30)  T(F): 97.4, Max: 97.5 (06-19 @ 21:30)  HR: 77 (72 - 77)  BP: 139/77 (126/76 - 139/77)  BP(mean): --  RR: 16 (16 - 18)  SpO2: 99% (98% - 100%)    General Exam:   General appearance: Patient appears agitated, restless, moving frequently in stretcher         Neurological Exam:  Mental Status: Awake and alert, mimics few simple commands, only spoke one sentence fluently, does not repeat or name.     Cranial Nerves: Pupils 6 mm, round, reactive to light, EOMI, BTT b/l, face symmetric, no obvious dysarthria    Motor:   Tone: Normal             Strength: UEs minimum 3/5 b/l, LEs no drift though difficult to assess completely 2/2 patient cooperation               Tremor: + Occasional high amplitude and high frequency action tremor    Sensation: Appears intact b/l LEs (though difficult to assess 2/2 patient cooperation)    Deep Tendon Reflexes: 2+ b/l patellas, UEs 3+ throughout    Toes: Downgoing    Other:                          13.3   4.3   )-----------( 113      ( 19 Jun 2017 21:10 )             38.1   Utox: + benzos, amphetamines    06-19    137  |  101  |  17  ----------------------------<  227<H>  3.3<L>   |  22  |  0.85    Ca    8.6      19 Jun 2017 21:10  Phos  2.4     06-19  Mg     2.1     06-19    TPro  6.5  /  Alb  3.9  /  TBili  0.3  /  DBili  x   /  AST  20  /  ALT  20  /  AlkPhos  43  06-19      Radiology    CTH No acute intracranial hemorrhage, mass effect, or CT evidence of an acute  vascular territorial infarct.    Minimal chronic ischemic changes in the frontoparietal white matter.

## 2017-06-20 NOTE — H&P ADULT - NSHPPHYSICALEXAM_GEN_ALL_CORE
GENERAL:  patient alert, noncommunicating, smiles inappropriately , periodic myoclonic jerks, not combative. breathing regular nonlabored  HEAD:  Atraumatic, Normocephalic  ENT: EOMI,  mydriatic pupils minimal reactive,  conjunctiva and sclera clear,  Dry mucosa parched lips,no pharyngeal erythema or exudates   NECK: supple , no JVD   CHEST/LUNG: Clear to auscultation bilaterally; No wheeze, equal breath sounds bilaterally   BACK: No spinal tenderness,  No CVA tenderness   HEART: Regular rate and rhythm; No murmurs, rubs, or gallops  ABDOMEN: Soft, Nontender, Nondistended; Bowel sounds present  EXTREMITIES:  No clubbing, cyanosis, or edema  MSK: No joint swelling or effusions, ROM intact   PSYCH: abnormal behavior , inappropriate response and nonsensical speech , smiles inappropriately  NEUROLOGY: alert , not following commands, unable to test orientation, moving all extremities spontaneously, intermittent vitaliy ballistic, and myoclonic jerks of upper extremities, CN intact , motor strength preserved. Patient unable to cooperate with advanced neuro testing.   SKIN: Normal color, No rashes or lesions GENERAL:  patient alert, noncommunicating, smiles inappropriately , periodic myoclonic jerks, not combative. breathing regular nonlabored  HEAD:  Atraumatic, Normocephalic  ENT: EOMI,  mydriatic pupils minimal reactive,  conjunctiva and sclera clear,  Dry mucosa parched lips,no pharyngeal erythema or exudates   NECK: supple , no JVD   CHEST/LUNG: Clear to auscultation bilaterally; No wheeze, equal breath sounds bilaterally   BACK: No spinal tenderness,  No CVA tenderness   HEART: Regular rate and rhythm; No murmurs, rubs, or gallops  ABDOMEN: Soft, Nontender, Nondistended; Bowel sounds present  EXTREMITIES:  No clubbing, cyanosis, or edema  MSK: No joint swelling or effusions, ROM intact   PSYCH: abnormal behavior , inappropriate response and nonsensical speech , smiles inappropriately  NEUROLOGY: alert , not following commands, aphasic, unable to test orientation, moving all extremities spontaneously, intermittent vitaliy ballistic, and myoclonic jerks of upper extremities, CN intact , motor strength preserved. Patient unable to cooperate with advanced neuro testing.   SKIN: Normal color, No rashes or lesions

## 2017-06-20 NOTE — H&P ADULT - PROBLEM SELECTOR PLAN 3
metabolic derangement likely effect from substance intoxication   - IV potassium 10 meq x 3   - recheck potassium

## 2017-06-20 NOTE — BEHAVIORAL HEALTH ASSESSMENT NOTE - NSBHCHARTREVIEWVS_PSY_A_CORE FT
Vital Signs Last 24 Hrs  T(C): 36.5, Max: 36.5 (06-20 @ 07:15)  T(F): 97.7, Max: 97.7 (06-20 @ 07:15)  HR: 79 (72 - 79)  BP: 123/76 (123/76 - 139/77)  BP(mean): --  RR: 18 (16 - 18)  SpO2: 95% (95% - 100%)

## 2017-06-20 NOTE — CONSULT NOTE ADULT - PROBLEM SELECTOR RECOMMENDATION 9
- C/w tx of substance intoxication as per primary team  - CIWA checks  - F/u TSH  - C/w blood glucose monitoring as per primary team - C/w management of amphetamine intoxication as per primary team  - KORI protocol given hx benzo use  - C/w blood glucose monitoring as per primary team  - F/u TSH

## 2017-06-20 NOTE — H&P ADULT - PROBLEM SELECTOR PLAN 4
metabolic derangement likely effect from substance intoxication , no evidence of infection   - monitor FSG

## 2017-06-20 NOTE — BEHAVIORAL HEALTH ASSESSMENT NOTE - NSBHCHARTREVIEWIMAGING_PSY_A_CORE FT
XAM:  CT BRAIN                            PROCEDURE DATE:  06/19/2017            INTERPRETATION:  CLINICAL INFORMATION: Altered mental status.    Description: A noncontrast head CT was performed. 5 mm axial images were   performed from the skull base to the vertex. Coronal and sagittal   reformats were performed.    COMPARISON: None available.    FINDINGS:    There is no acute intra-axial or extra-axial hemorrhage. There is no mass   effect or shift of the midline. The basal cisterns are not effaced. There   is cerebral volume loss with commensurate prominence of the ventricles   and sulci. Minimal chronic ischemic changes are seen in the   frontoparietal white matter. There is no CT evidence of an acute vascular   territorial infarct. There are atherosclerotic calcifications of the   intracranial carotid arteries.    The regional soft tissues and osseous structures are unremarkable apart   from evidence of bilateral lens surgery. The visualized paranasal sinuses   and tympanic/mastoidcavities are clear apart from minimal bilateral   ethmoid sinus mucosal thickening.    IMPRESSION:    No acute intracranial hemorrhage, mass effect, or CT evidence of an acute   vascular territorial infarct.    Minimal chronic ischemic changes in thefrontoparietal white matter. EXAM:  CT BRAIN                            PROCEDURE DATE:  06/19/2017            INTERPRETATION:  CLINICAL INFORMATION: Altered mental status.    Description: A noncontrast head CT was performed. 5 mm axial images were   performed from the skull base to the vertex. Coronal and sagittal   reformats were performed.    COMPARISON: None available.    FINDINGS:    There is no acute intra-axial or extra-axial hemorrhage. There is no mass   effect or shift of the midline. The basal cisterns are not effaced. There   is cerebral volume loss with commensurate prominence of the ventricles   and sulci. Minimal chronic ischemic changes are seen in the   frontoparietal white matter. There is no CT evidence of an acute vascular   territorial infarct. There are atherosclerotic calcifications of the   intracranial carotid arteries.    The regional soft tissues and osseous structures are unremarkable apart   from evidence of bilateral lens surgery. The visualized paranasal sinuses   and tympanic/mastoidcavities are clear apart from minimal bilateral   ethmoid sinus mucosal thickening.    IMPRESSION:    No acute intracranial hemorrhage, mass effect, or CT evidence of an acute   vascular territorial infarct.    Minimal chronic ischemic changes in thefrontoparietal white matter.

## 2017-06-20 NOTE — H&P ADULT - ASSESSMENT
66 F w/pmh  anxiety and depression, presents for altered mental state. CTH negative for acute bleed or infarct , EKG with prolonged QTC, Utox positive for benzodiazapine and amphetamines. 66 F w/pmh  anxiety and depression, presents for altered mental state. Aphasic on exam with myoclonic jerks,  CTH negative for acute bleed or infarct , EKG with prolonged QTC, Utox positive for benzodiazapine and amphetamines.

## 2017-06-20 NOTE — BEHAVIORAL HEALTH ASSESSMENT NOTE - OTHER
smudged make up, bangles. Looks thin Pt kept in stretcher due to falls risk Pt unable to say Disconnected, confused poverty of thought paucity pt made some odd statements, but unable to fully assess Looking around room, staring, but denies hallucinations

## 2017-06-20 NOTE — CONSULT NOTE ADULT - SUBJECTIVE AND OBJECTIVE BOX
Patient seen and evaluated @ 8pm  Chief Complaint: acute change in mental status and abnormal ECG    HPI:    Patient is a 66 year old female with a past medical history anxiety and depression, presents for altered mental state.   She was last see/heard from around 1 pm yesterday. She was found by her , Dr. Garry Ayala in her bed, non-communicative making jerking motions with her hands and head.  She is unable to add to her history and has no memory of the events.  She follows regularly with psychiatry ( Dr. Robles)    PMH:   Osteoporosis  Anxiety  Mental health problem  No pertinent past medical history    PSH:   Acquired cataract  No significant past surgical history    Medications:   LORazepam     Tablet 1milliGRAM(s) Oral every 2 hours PRN  Trazadone  Wellbutrin  Vitamins/supplements (tumeric, coQ10?, fish oiletc    Allergies:  No Known Allergies    FAMILY HISTORY:  Family history of hypertension (Father)    Social History:  Smoking: None  Alcohol: 8 glasses of wine per week.    Review of Systems:   Unable to obtain       Physical Exam:  T(C): 36.5, Max: 36.5 (06-20 @ 07:15)  HR: 82 (76 - 82)  BP: 128/79 (123/76 - 139/77)  RR: 18 (16 - 18)  SpO2: 95% (95% - 100%)  Wt(kg): --    I & Os for current day (as of 06-20 @ 21:49)  =============================================  IN: 480 ml / OUT: 0 ml / NET: 480 ml    Daily Height in cm: 170.18 (20 Jun 2017 15:12)    Daily     Exam was limited due to patient cooperation  Appearance: Normal, appears agitated.  Eyes:   bloodshot  HENT: dry oral mucosa  Cardiovascular: Regular rhythm, Normal S1 and S2, no murmur, rub; no peripheral edema; no JVD  Respiratory: Clear to auscultation bilaterally  Musculoskeletal: No clubbing   Neurologic: No focal weakness, normal gait  Psychiatry: She was awake and followed commands, She reported feeling ashamed that she "seemed crazy". She said that she was aware of what was going on, but could not do anything different.       Cardiovascular Diagnostic Testing:  ECG: SR with QTc of 505, repeat ECT had a normal QTc (though it was dated 6/17)       Interpretation of Telemetry:    Imaging: CT head: ?parietal stroke per Neuro    Labs:                        13.5   8.23  )-----------( 139      ( 20 Jun 2017 07:24 )             40.0     06-20    138  |  102  |  10  ----------------------------<  92  4.1   |  21<L>  |  0.69    Ca    8.6      20 Jun 2017 07:24  Phos  2.4     06-19  Mg     2.1     06-19    TPro  7.1  /  Alb  3.9  /  TBili  0.3  /  DBili  x   /  AST  27  /  ALT  19  /  AlkPhos  42  06-20    PT/INR - ( 19 Jun 2017 21:10 )   PT: 11.7 sec;   INR: 1.08 ratio         PTT - ( 19 Jun 2017 21:10 )  PTT:30.7 sec  CARDIAC MARKERS ( 19 Jun 2017 21:10 )  x     / <0.01 ng/mL / 60 U/L / x     / 1.0 ng/mL    Hemoglobin A1C, Whole Blood: 5.1 % (06-20 @ 07:24)    Thyroid Stimulating Hormone, Serum: 2.16 uIU/mL (06-20 @ 07:19)  Thyroid Stimulating Hormone, Serum: 1.17 uIU/mL (06-20 @ 06:16)  Amphetamine, Urine: Positive: TEST REPEATED. (06.19.17 @ 21:21)  Benzodiazepine, Urine: Positive: TEST REPEATED. (06.19.17 @ 21:21)

## 2017-06-20 NOTE — BEHAVIORAL HEALTH ASSESSMENT NOTE - SUMMARY
66  F with a past psychiatric history of anxiety and depression, no prior psych hospitalizations or suicide attempts, currently in psych treatment with Dr Robles, who presents with AMS x 1 day. Pt was found by her ; not communicating and tremulousness, with twitching of her extremities. On exam, patient awake, alert but oriented x1-2, tremulous, trying to get out of bed, had difficulty processing and answering basic questions. Pt's urine positive for amphetamines and benzos, but pt only getting Flurazepam and Alprazolam from outpt psychiatrist. Would need more collateral, but as per outpt psychiatrist, pt is far from her baseline.

## 2017-06-20 NOTE — PROVIDER CONTACT NOTE (OTHER) - SITUATION
unable to fully assess pt on ciwa scale. increase of ciwa scale from 4 to 11 and not fully assessed for certain parameters

## 2017-06-20 NOTE — H&P ADULT - HISTORY OF PRESENT ILLNESS
Patient is unable to provide history, family unavailable at time of history.  Presenting history Obtained via discussion with Emergency room provider and Nursing staff.     Patient is a 66 year old female with a past medical history anxiety and depression, presents for altered mental state. On day of admission, patient was found at home by , unable to communicate and making jerking movements.  Patient was not able to answer questions. When EMS arrived , they found patients blood sugar to be 50 and she was given 1 amp D50, per family patient has no history of suicidal ideation or attempts.  She follows regularly with psychiatry ( Dr. Robles)    Chart reviewed

## 2017-06-20 NOTE — PROVIDER CONTACT NOTE (OTHER) - SITUATION
pt unable to properly answer questions for CIWA score regarding tactile, auditory and visual hallucinations

## 2017-06-20 NOTE — BEHAVIORAL HEALTH ASSESSMENT NOTE - NSBHCONSULTMEDOTHER_PSY_A_CORE FT
CIWA to r/o benzo withdrawal, with Ativan 1 mg q2hr prn CIWA score increase of 2 points or CIWA score greater than 8 mg. Will hold off standing benzo taper as pt already seems confused.

## 2017-06-21 DIAGNOSIS — R94.31 ABNORMAL ELECTROCARDIOGRAM [ECG] [EKG]: ICD-10-CM

## 2017-06-21 DIAGNOSIS — F41.1 GENERALIZED ANXIETY DISORDER: ICD-10-CM

## 2017-06-21 DIAGNOSIS — R41.82 ALTERED MENTAL STATUS, UNSPECIFIED: ICD-10-CM

## 2017-06-21 LAB
ANION GAP SERPL CALC-SCNC: 13 MMOL/L — SIGNIFICANT CHANGE UP (ref 5–17)
APPEARANCE UR: CLEAR — SIGNIFICANT CHANGE UP
BILIRUB UR-MCNC: NEGATIVE — SIGNIFICANT CHANGE UP
BUN SERPL-MCNC: 7 MG/DL — SIGNIFICANT CHANGE UP (ref 7–23)
CALCIUM SERPL-MCNC: 9.2 MG/DL — SIGNIFICANT CHANGE UP (ref 8.4–10.5)
CHLORIDE SERPL-SCNC: 102 MMOL/L — SIGNIFICANT CHANGE UP (ref 96–108)
CHOLEST SERPL-MCNC: 207 MG/DL — HIGH (ref 10–199)
CO2 SERPL-SCNC: 24 MMOL/L — SIGNIFICANT CHANGE UP (ref 22–31)
COLOR SPEC: SIGNIFICANT CHANGE UP
CREAT SERPL-MCNC: 0.76 MG/DL — SIGNIFICANT CHANGE UP (ref 0.5–1.3)
DIFF PNL FLD: ABNORMAL
GLUCOSE SERPL-MCNC: 111 MG/DL — HIGH (ref 70–99)
GLUCOSE UR QL: NEGATIVE — SIGNIFICANT CHANGE UP
HCT VFR BLD CALC: 41.8 % — SIGNIFICANT CHANGE UP (ref 34.5–45)
HDLC SERPL-MCNC: 111 MG/DL — SIGNIFICANT CHANGE UP (ref 40–125)
HGB BLD-MCNC: 13.8 G/DL — SIGNIFICANT CHANGE UP (ref 11.5–15.5)
KETONES UR-MCNC: NEGATIVE — SIGNIFICANT CHANGE UP
LEUKOCYTE ESTERASE UR-ACNC: NEGATIVE — SIGNIFICANT CHANGE UP
LIPID PNL WITH DIRECT LDL SERPL: 84 MG/DL — SIGNIFICANT CHANGE UP
MAGNESIUM SERPL-MCNC: 2.4 MG/DL — SIGNIFICANT CHANGE UP (ref 1.6–2.6)
MCHC RBC-ENTMCNC: 32.3 PG — SIGNIFICANT CHANGE UP (ref 27–34)
MCHC RBC-ENTMCNC: 33.1 GM/DL — SIGNIFICANT CHANGE UP (ref 32–36)
MCV RBC AUTO: 97.5 FL — SIGNIFICANT CHANGE UP (ref 80–100)
NITRITE UR-MCNC: NEGATIVE — SIGNIFICANT CHANGE UP
PH UR: 7.5 — SIGNIFICANT CHANGE UP (ref 5–8)
PHOSPHATE SERPL-MCNC: 1.6 MG/DL — LOW (ref 2.5–4.5)
PLATELET # BLD AUTO: 145 K/UL — LOW (ref 150–400)
POTASSIUM SERPL-MCNC: 3.9 MMOL/L — SIGNIFICANT CHANGE UP (ref 3.5–5.3)
POTASSIUM SERPL-SCNC: 3.9 MMOL/L — SIGNIFICANT CHANGE UP (ref 3.5–5.3)
PROT UR-MCNC: NEGATIVE — SIGNIFICANT CHANGE UP
RBC # BLD: 4.28 M/UL — SIGNIFICANT CHANGE UP (ref 3.8–5.2)
RBC # FLD: 12.3 % — SIGNIFICANT CHANGE UP (ref 10.3–14.5)
SODIUM SERPL-SCNC: 139 MMOL/L — SIGNIFICANT CHANGE UP (ref 135–145)
SP GR SPEC: 1.01 — LOW (ref 1.01–1.02)
TOTAL CHOLESTEROL/HDL RATIO MEASUREMENT: 1.9 RATIO — LOW (ref 3.3–7.1)
TRIGL SERPL-MCNC: 61 MG/DL — SIGNIFICANT CHANGE UP (ref 10–149)
UROBILINOGEN FLD QL: NEGATIVE — SIGNIFICANT CHANGE UP
WBC # BLD: 6.8 K/UL — SIGNIFICANT CHANGE UP (ref 3.8–10.5)
WBC # FLD AUTO: 6.8 K/UL — SIGNIFICANT CHANGE UP (ref 3.8–10.5)

## 2017-06-21 PROCEDURE — 99233 SBSQ HOSP IP/OBS HIGH 50: CPT

## 2017-06-21 PROCEDURE — 70551 MRI BRAIN STEM W/O DYE: CPT | Mod: 26

## 2017-06-21 PROCEDURE — 99232 SBSQ HOSP IP/OBS MODERATE 35: CPT

## 2017-06-21 RX ORDER — ATORVASTATIN CALCIUM 80 MG/1
40 TABLET, FILM COATED ORAL AT BEDTIME
Qty: 0 | Refills: 0 | Status: DISCONTINUED | OUTPATIENT
Start: 2017-06-21 | End: 2017-06-21

## 2017-06-21 RX ORDER — CLOPIDOGREL BISULFATE 75 MG/1
75 TABLET, FILM COATED ORAL DAILY
Qty: 0 | Refills: 0 | Status: DISCONTINUED | OUTPATIENT
Start: 2017-06-21 | End: 2017-06-21

## 2017-06-21 RX ADMIN — CLOPIDOGREL BISULFATE 75 MILLIGRAM(S): 75 TABLET, FILM COATED ORAL at 11:20

## 2017-06-21 RX ADMIN — Medication 1 MILLIGRAM(S): at 04:16

## 2017-06-21 RX ADMIN — Medication 1 MILLIGRAM(S): at 09:30

## 2017-06-21 NOTE — PROGRESS NOTE ADULT - ASSESSMENT
66 F w/pmh  anxiety and depression, presents for altered mental state. Aphasic on exam with myoclonic jerks,  CTH negative for acute bleed or infarct , EKG with prolonged QTC, Utox positive for benzodiazapine and amphetamines.

## 2017-06-21 NOTE — PROGRESS NOTE BEHAVIORAL HEALTH - NSBHCHARTREVIEWLAB_PSY_A_CORE FT
06-21    139  |  102  |  7   ----------------------------<  111<H>  3.9   |  24  |  0.76    Ca    9.2      21 Jun 2017 08:47  Phos  1.6     06-21  Mg     2.4     06-21    TPro  7.1  /  Alb  3.9  /  TBili  0.3  /  DBili  x   /  AST  27  /  ALT  19  /  AlkPhos  42  06-20                        13.8   6.8   )-----------( 145      ( 21 Jun 2017 08:47 )             41.8
reviewed by writer; +amphetamines on utox (per  and psychiatrist, pt has no hx of abuse/prescription of these)

## 2017-06-21 NOTE — PROGRESS NOTE ADULT - SUBJECTIVE AND OBJECTIVE BOX
Neurology Progress Note      the patient's AMS is improved    MEDICATIONS  (STANDING):    MEDICATIONS  (PRN):  LORazepam     Tablet 1milliGRAM(s) Oral every 2 hours PRN Agitation              Vital Signs Last 24 Hrs  T(C): 36.9, Max: 36.9 (06-21 @ 04:11)  T(F): 98.5, Max: 98.5 (06-21 @ 05:17)  HR: 88 (80 - 105)  BP: 131/80 (106/70 - 131/80)  BP(mean): --  RR: 18 (18 - 18)  SpO2: 96% (94% - 96%)    Physical exam  MENTAL STATUS- Alert, attends, fluent, non-dysarthric, follows commands, oriented, good memory and affect.  CRANIAL NERVES-II Optic - Bilateral - Normal Visual Fields. III-IV-VI EOMI & Pupils - Bilateral - Normal Bilaterally. V Trigeminal - Bilateral - Normal bilateral facial sensation and jaw movement. VII Facial - Normal bilateral facial movement. VIII Acoustic - Bilateral - Hearing normal to voice bilaterally. IX Glossopharyngeal / X Vagus - Normal palate elevates symmetrically. XI Accessory - Normal Bilaterally. XII Hypoglossal - Tongue protrudes midline and moves symmetrically.  MOTOR-Bulk and Contour - Normal. Tone - Normal tone, no abnormal movements. Strength - 5/5 normal muscle strength - Strength full throughout.  SENSORY-Normal - LT, DSS, Vibration.  REFLEXES- DTR's 2+ throughout.  COORDINATION-Normal FFM, XIOMARA, FNF - bilaterally.  GAIT-Normal base, heel, toe, tandem.    CBC Full  -  ( 20 Jun 2017 07:24 )  WBC Count : 8.23 K/uL  Hemoglobin : 13.5 g/dL  Hematocrit : 40.0 %  Platelet Count - Automated : 139 K/uL  Mean Cell Volume : 93.9 fl  Mean Cell Hemoglobin : 31.7 pg  Mean Cell Hemoglobin Concentration : 33.8 gm/dL  Auto Neutrophil # : 6.98 K/uL  Auto Lymphocyte # : 0.65 K/uL  Auto Monocyte # : 0.58 K/uL  Auto Eosinophil # : 0.01 K/uL  Auto Basophil # : 0.00 K/uL  Auto Neutrophil % : 84.9 %  Auto Lymphocyte % : 7.9 %  Auto Monocyte % : 7.0 %  Auto Eosinophil % : 0.1 %  Auto Basophil % : 0.0 %      06-20    138  |  102  |  10  ----------------------------<  92  4.1   |  21<L>  |  0.69    Ca    8.6      20 Jun 2017 07:24  Phos  2.4     06-19  Mg     2.1     06-19    TPro  7.1  /  Alb  3.9  /  TBili  0.3  /  DBili  x   /  AST  27  /  ALT  19  /  AlkPhos  42  06-20    LIVER FUNCTIONS - ( 20 Jun 2017 07:24 )  Alb: 3.9 g/dL / Pro: 7.1 g/dL / ALK PHOS: 42 U/L / ALT: 19 U/L / AST: 27 U/L / GGT: x

## 2017-06-21 NOTE — PROVIDER CONTACT NOTE (OTHER) - ACTION/TREATMENT ORDERED:
np notified ciwa order set incomplete
NP will come assess, continue CIWA assessments
continue on 1;1 observation. np also informed unable to fully assess all parameters
np and manager notified.
np informed of inability to perform full assessment.
Continue to monitor output
NP aware. continue to monitor.
Reschedule MRI, go when pt able to tolerate

## 2017-06-21 NOTE — PROGRESS NOTE BEHAVIORAL HEALTH - PROBLEM SELECTOR PLAN 1
-c/w PRN ativan 1-2mg IVP q4h agitation/anxiety.  -give pt 1 time dose of ativan 1mg IVP now to help reduce agitation/restlessness for pt to receive MRI.  -c/w CIWA protocol as indicated by Dr. Cordero   -would not recommend standing benzos at this time due to pt's AMS but when pt's mental status more cleared would re-start her home meds.   -psych will continue to follow.  -cont. to f/u with neuro recs, cardiology.

## 2017-06-21 NOTE — PROVIDER CONTACT NOTE (OTHER) - BACKGROUND
Adm with AMS, [facility] protocol, 1:1
Adm with altered mental status
pt found by  to have twitching movements, mostly noneverbal, and disoriented. urine positive for benzos and amphetamines
Adm with altered mental status, 1:1, [facility] protocol

## 2017-06-21 NOTE — PROGRESS NOTE BEHAVIORAL HEALTH - NSBHFUPINTERVALHXFT_PSY_A_CORE
Pt seen, AOA x 3, more alert today than on admission. Pt was seen by psych last night, appeared confused, per chart records, and currently feeling better, denies anxiety or depressive symptoms, no si/hi and wants to know "where is my ?" Pt speaking clearly, normal reasoning, denies feeling hopeless or helpless. No current manic or psychotic symptoms present.

## 2017-06-21 NOTE — PROGRESS NOTE BEHAVIORAL HEALTH - OTHER
wearing makeup Looks thin pt on falls precautions but was observed to have slowed, steady gait "not sane" mostly constricted to neutral but at times was odd and pt would smile pt made some odd statements including paranoid ideation that writer wanted to harm pt and throw her down a "chute" suspect visual and AH as pt was often nervously looking around room and reported distractibility and that she was unable to finish her thougths

## 2017-06-21 NOTE — PROGRESS NOTE ADULT - SUBJECTIVE AND OBJECTIVE BOX
HPI: She feels better, but has no memory since monday afternoon till this AM.    The medical history is unchanged.  Review Of Systems:  No orthopnea or change in leg edema. The remainder of his ROS is unchanged.    Medications:  LORazepam     Tablet 1milliGRAM(s) Oral every 2 hours PRN    Allergies    No Known Allergies       Vitals:  T(F): 98.4, Max: 98.6 ( @ 10:30)  HR: 84 (80 - 105)  BP: 108/67 (106/70 - 148/84)  RR: 18 (18 - 18)  SpO2: 94% (94% - 96%)  Wt(kg): --  Daily     Daily Weight in k.8 (2017 10:18)  I&O's Summary  I & Os for 24h ending 2017 07:00  =============================================  IN: 480 ml / OUT: 200 ml / NET: 280 ml    I & Os for current day (as of 2017 17:30)  =============================================  IN: 360 ml / OUT: 350 ml / NET: 10 ml      Physical Exam:  Appearance: NAD  HEENT: No icterus or JVD  Cardiovascular: Regular rhythm, normal S1, S2, No murmurs or rubs, No edema  Respiratory: clear to ascultation bilaterally, no crackles or wheeze.  Gastrointestinal: +BS, soft  Musculoskeletal: No clubbing  Neurologic: Non-focal, alert and oriented, Mood & affect appropriate  Lymphatic: No lymphadenopathy  Skin: Warm and moist, no cyanosis                          13.8   6.8   )-----------( 145      ( 2017 08:47 )             41.8     -    139  |  102  |  7   ----------------------------<  111<H>  3.9   |  24  |  0.76    Ca    9.2      2017 08:47  Phos  1.6     -  Mg     2.4         TPro  7.1  /  Alb  3.9  /  TBili  0.3  /  DBili  x   /  AST  27  /  ALT  19  /  AlkPhos  42      CARDIAC MARKERS ( 2017 21:10 )  x     / <0.01 ng/mL / 60 U/L / x     / 1.0 ng/mL      PT/INR - ( 2017 21:10 )   PT: 11.7 sec;   INR: 1.08 ratio         PTT - ( 2017 21:10 )  PTT:30.7 sec    Total Cholesterol: 207  LDL: 84  HDL: 111  T    Hemoglobin A1C, Whole Blood: 5.1 % ( @ 07:24)      Interpretation of Telemetry:  ECG: SR QTc 470 now.  Imaging: MRI Impression:  No acute hemorrhage or infarct.  Age-appropriate involutional changes.

## 2017-06-21 NOTE — PROGRESS NOTE BEHAVIORAL HEALTH - NSBHFUPINTERVALHXFT_PSY_A_CORE
f/u requested by WALDEMAR White and pt's , Dr. Ayala. Per NP, pt has worsened mental status, has been agitated, restless; gets up to use the bathroom constantly. Writer reviewed behavioral health assessment done by Dr. Cordero. On evaluation pt had odd affect that was unstable and inappropriate to content, was poorly related, psychomotor agitated; constantly getting up to use the bathroom. Pt blankly stared at writer at times, not responding to questions sometimes although she was able to repeat some statements from writer when asked, was oriented to name and place, but not situation. Pt would often say "I'm not sane," and "I need to get sober." Pt unable to give hx of her doctors names nor medications. Pt spontaneously made statement to writer that writer wanted to harm patient and "throw her down the chute" and pt gestured to the floor although pt's affect was non-reactive when she made this statement and made in the context of writer speaking calmly and reassuringly/supportively to patient. Pt unable to tolerate further interview as she continued to get up and use the bathroom.   Writer obtained collateral from pt's , Dr. Ayala, who reports pt has no hx of substance use, no recent illnesses, has been consistently f/u with her doctors, no known new life stressors, no medication mis-use or changes, no suicidality/depression expressed. He says pt was last seen at baseline mental status around 1pm June 19th and then around 6 or 8pm was found to have AMS, making jerking motions, and required emergency intervention. He believes pt's mental status has been worsening since her admission. He denies she abuses etOH and is concerned she is on a CIWA. Writer obtained collateral from Dr. Robles who echoed his previous report to Dr. Cordero (see  assessment for details). Dr. Robles denies med changes recently, saw pt 1 week ago and she was at baseline mental status which is alert, sophisticated, intelligent, bright, outspoken. He denies pt has hx of substance abuse. He reports she takes Flurazepam 30mg at bedtime as needed for sleep and Xanax 1mg daily for anxiety. He denies pt has hx of impulsivity, suicidality, inpt admissions. f/u requested by WALDEMAR White and pt's , Dr. Ayala. Per NP, pt has worsened mental status, has been agitated, restless; gets up to use the bathroom constantly. Writer reviewed behavioral health assessment done by Dr. Cordero. On evaluation pt had odd affect that was unstable and inappropriate to content, was poorly related, psychomotor agitated; constantly getting up to use the bathroom. Pt blankly stared at writer at times, not responding to questions sometimes although she was able to repeat some statements from writer when asked, was oriented to name and place, but not situation. Pt would often say "I'm not sane," and "I need to get sober." Pt unable to give hx of her doctors names nor medications. Pt spontaneously made statement to writer that writer wanted to harm patient and "throw her down the chute" and pt gestured to the floor although pt's affect was non-reactive when she made this statement and made in the context of writer speaking calmly and reassuringly/supportively to patient. Pt unable to tolerate further interview as she continued to get up and use the bathroom.   Writer obtained collateral from pt's , Dr. Ayala, who reports pt has no hx of substance use, no recent illnesses, has been consistently f/u with her doctors, no known new life stressors, no medication mis-use or changes, no suicidality/depression expressed. He says pt was last seen at baseline mental status around 1pm June 19th and then around 6 or 8pm was found to have AMS, making jerking motions, and required emergency intervention. He believes pt's mental status has been worsening since her admission. He denies she abuses etOH. He says pt also sees Dr. Iliana Mahan weekly for CBT and encourages tx team to contact her at [phone] for further collateral. Writer obtained collateral from Dr. Robles who echoed his previous report to Dr. Cordero (see  assessment for details). Dr. Robles denies med changes recently, saw pt 1 week ago and she was at baseline mental status which is alert, sophisticated, intelligent, bright, outspoken. He denies pt has hx of substance abuse. He reports she takes Flurazepam 30mg at bedtime as needed for sleep and Xanax 1mg daily for anxiety. He denies pt has hx of impulsivity, suicidality, inpt admissions.

## 2017-06-21 NOTE — PROGRESS NOTE ADULT - ASSESSMENT
AMS improved- appeears due to L parietal stroke  D/W nrad and stroke team-await their opinion  CD  MRI on tele  Lipid/statin/anti -plts  cardio following---echo  neuro chks

## 2017-06-21 NOTE — PROVIDER CONTACT NOTE (OTHER) - DATE AND TIME:
20-Jun-2017 15:20
20-Jun-2017 20:00
20-Jun-2017 03:15
20-Jun-2017 15:00
20-Jun-2017 15:20
20-Jun-2017 18:07
20-Jun-2017 22:30
20-Jun-2017 23:00

## 2017-06-21 NOTE — PROGRESS NOTE ADULT - ATTENDING COMMENTS
discussed with patient in detail, all questions answered \  discussed with  at patients request  dc plan for 6/22 if OK

## 2017-06-21 NOTE — PROGRESS NOTE BEHAVIORAL HEALTH - NSBHCHARTREVIEWVS_PSY_A_CORE FT
T(C): 36.9, Max: 37 (06-21 @ 10:30)  HR: 84 (80 - 105)  BP: 108/67 (106/70 - 148/84)  RR: 18 (18 - 18)  SpO2: 94% (94% - 96%)  Wt(kg): --
reviewed by writer; eloy

## 2017-06-21 NOTE — PROGRESS NOTE ADULT - SUBJECTIVE AND OBJECTIVE BOX
VASCULAR NEUROLOGY ATTENDING NOTE    Patient seen and examined history and imaging reviewed. Agree with resident/fellow note as applicable.      Chart reviewed (20 Jun 2017 02:41)      Overnight Events: Improved MS    VITALS:  Vital Signs Last 24 Hrs  T(C): 36.9, Max: 36.9 (06-21 @ 04:11)  T(F): 98.4, Max: 98.5 (06-21 @ 05:17)  HR: 93 (80 - 105)  BP: 148/84 (106/70 - 148/84)  BP(mean): --  RR: 18 (18 - 18)  SpO2: 95% (94% - 96%)    Labs:   06-21    139  |  102  |  7   ----------------------------<  111<H>  3.9   |  24  |  0.76    Ca    9.2      21 Jun 2017 08:47  Phos  1.6     06-21  Mg     2.4     06-21    TPro  7.1  /  Alb  3.9  /  TBili  0.3  /  DBili  x   /  AST  27  /  ALT  19  /  AlkPhos  42  06-20                          13.8   6.8   )-----------( 145      ( 21 Jun 2017 08:47 )             41.8       MEDS:  clopidogrel Tablet 75milliGRAM(s) Oral daily  atorvastatin 40milliGRAM(s) Oral at bedtime      Exam:   MS: AAOx3, no aphasia, no neglect, nl. memory  CNs:  PERRL, EOMI, VFF, face symmetric. No dysarthria  Motor:  no drift, 5/5 strength throughout, XIOMARA intact  Sensory:  intact sensation throughout, no extinction  Coord:  no dysmetria,  Reflexes:  no asymmetry,  Gait: normal

## 2017-06-21 NOTE — PROVIDER CONTACT NOTE (OTHER) - REASON
orders for ciwa
unable to fully assess ciwa scale
unable to fully assess pt on ciwa scale
unable to properly assess CIWA score
Bladder scan 492ml
Pt unable to go for MRI
Pt agitated, incomplete CIWA scores
unable to fully assess ciwa

## 2017-06-21 NOTE — PROGRESS NOTE BEHAVIORAL HEALTH - NSBHCHARTREVIEWINVESTIGATE_PSY_A_CORE FT
reviewed- qtc initially prolonged to 505ms- most recent ekgs have shown qtc wnl, pt on tele, was evaluated by cardiology and neuro- notes were reviewed. neuro plans to do MRI but pt has been agitated, unable to cooperate

## 2017-06-21 NOTE — PROGRESS NOTE ADULT - ASSESSMENT
She is a 66 year old with acute change in mental status. No stroke on MRI. Prolonged QT has resolved and her mental state is slowly improving. No arrhythmias.

## 2017-06-21 NOTE — PROGRESS NOTE ADULT - SUBJECTIVE AND OBJECTIVE BOX
Patient is a 66y old  Female who presents with a chief complaint of AMS x one day (2017 02:41)  Requested by Dr Ayala (spouse of pt) to assume care today      SUBJECTIVE / OVERNIGHT EVENTS: No nausea, vomiting or diarrhea, no fever or chills, no dizziness or chest pain, no dysuria or hematuria, no jt pain or swelling  Denies any complaints  no confusion today    MEDICATIONS  (STANDING):  clopidogrel Tablet 75milliGRAM(s) Oral daily  atorvastatin 40milliGRAM(s) Oral at bedtime    MEDICATIONS  (PRN):  LORazepam     Tablet 1milliGRAM(s) Oral every 2 hours PRN Agitation      Vital Signs Last 24 Hrs  T(C): 36.9, Max: 37 (- @ 10:30)  HR: 84 (80 - 105)  BP: 108/67 (106/70 - 148/84)  RR: 18 (18 - 18)  SpO2: 94% (94% - 96%)  Wt(kg): --  CAPILLARY BLOOD GLUCOSE  135 (2017 11:36)  93 (2017 07:49)  103 (2017 04:08)  87 (2017 00:24)  90 (2017 20:13)  91 (2017 14:00)    I&O's Summary  I & Os for 24h ending 2017 07:00  =============================================  IN: 480 ml / OUT: 200 ml / NET: 280 ml    I & Os for current day (as of 2017 12:41)  =============================================  IN: 360 ml / OUT: 350 ml / NET: 10 ml      PHYSICAL EXAM:  GENERAL: NAD, well-developed  HEAD:  Atraumatic, Normocephalic  EYES: EOMI, PERRLA, conjunctiva and sclera clear  NECK: Supple, No JVD  CHEST/LUNG: Clear to auscultation bilaterally; No wheeze  HEART: Regular rate and rhythm; No murmurs, rubs, or gallops  ABDOMEN: Soft, Nontender, Nondistended; Bowel sounds present  EXTREMITIES:  2+ Peripheral Pulses, No clubbing, cyanosis, or edema  PSYCH: AAOx3 speech slow but appropriate  NEUROLOGY: non-focal  SKIN: No rashes or lesions    LABS:                        13.8   6.8   )-----------( 145      ( 2017 08:47 )             41.8     06-21    139  |  102  |  7   ----------------------------<  111<H>  3.9   |  24  |  0.76    Ca    9.2      2017 08:47  Phos  1.6     -  Mg     2.4     -    TPro  7.1  /  Alb  3.9  /  TBili  0.3  /  DBili  x   /  AST  27  /  ALT  19  /  AlkPhos  42  06-20    PT/INR - ( 2017 21:10 )   PT: 11.7 sec;   INR: 1.08 ratio         PTT - ( 2017 21:10 )  PTT:30.7 sec  CARDIAC MARKERS ( 2017 21:10 )  x     / <0.01 ng/mL / 60 U/L / x     / 1.0 ng/mL      Urinalysis Basic - ( 2017 21:21 )    Color: Yellow / Appearance: Clear / S.015 / pH: x  Gluc: x / Ketone: Small  / Bili: Negative / Urobili: Negative   Blood: x / Protein: Negative / Nitrite: Negative   Leuk Esterase: Negative / RBC: 0-2 /HPF / WBC x   Sq Epi: x / Non Sq Epi: x / Bacteria: x          Consultant(s) Notes Reviewed:      Care Discussed with Consultants/Other Providers:    Contact Number, Dr De La Garza [phone]

## 2017-06-21 NOTE — PROVIDER CONTACT NOTE (OTHER) - ASSESSMENT
pt restless and uncooperative. unable to focus on questions and or not answering.
pt unwilling to discuss her auditory and tactile disturbances. pt having hallucinations and unwilling to discuss.
pt with flat affect. unable to focus, restless
Bladder scan 492 ml, pt voiding frequently but small volumes
Pt A&oX1. verbalizes her name but will not verbalize much else. obeys some commands- at times. pt has symmetrical smile. pupils dilated but reactive to light and equal. had senior nurses assess pt as well- everyone finding the same symptoms.
VSS, patient agitated, unable to complete EKG. Psych consult at bedside and  at bedside, both agree pt is too agitated and will not be still for procedure
order set missing frequency of assessment
Pt agitated, VSS, CIWA score incomplete d/t lack of description of auditory, visual, tactile disturbances

## 2017-06-22 VITALS
OXYGEN SATURATION: 97 % | RESPIRATION RATE: 18 BRPM | DIASTOLIC BLOOD PRESSURE: 89 MMHG | HEART RATE: 81 BPM | TEMPERATURE: 98 F | SYSTOLIC BLOOD PRESSURE: 135 MMHG

## 2017-06-22 DIAGNOSIS — F60.3 BORDERLINE PERSONALITY DISORDER: ICD-10-CM

## 2017-06-22 PROCEDURE — 85014 HEMATOCRIT: CPT

## 2017-06-22 PROCEDURE — 83735 ASSAY OF MAGNESIUM: CPT

## 2017-06-22 PROCEDURE — 82962 GLUCOSE BLOOD TEST: CPT

## 2017-06-22 PROCEDURE — 84100 ASSAY OF PHOSPHORUS: CPT

## 2017-06-22 PROCEDURE — 70450 CT HEAD/BRAIN W/O DYE: CPT

## 2017-06-22 PROCEDURE — 93880 EXTRACRANIAL BILAT STUDY: CPT

## 2017-06-22 PROCEDURE — 99231 SBSQ HOSP IP/OBS SF/LOW 25: CPT

## 2017-06-22 PROCEDURE — 70551 MRI BRAIN STEM W/O DYE: CPT

## 2017-06-22 PROCEDURE — 82330 ASSAY OF CALCIUM: CPT

## 2017-06-22 PROCEDURE — 80307 DRUG TEST PRSMV CHEM ANLYZR: CPT

## 2017-06-22 PROCEDURE — 99285 EMERGENCY DEPT VISIT HI MDM: CPT | Mod: 25

## 2017-06-22 PROCEDURE — 82803 BLOOD GASES ANY COMBINATION: CPT

## 2017-06-22 PROCEDURE — 84443 ASSAY THYROID STIM HORMONE: CPT

## 2017-06-22 PROCEDURE — 87086 URINE CULTURE/COLONY COUNT: CPT

## 2017-06-22 PROCEDURE — 80048 BASIC METABOLIC PNL TOTAL CA: CPT

## 2017-06-22 PROCEDURE — 85027 COMPLETE CBC AUTOMATED: CPT

## 2017-06-22 PROCEDURE — 87186 SC STD MICRODIL/AGAR DIL: CPT

## 2017-06-22 PROCEDURE — 81001 URINALYSIS AUTO W/SCOPE: CPT

## 2017-06-22 PROCEDURE — 80053 COMPREHEN METABOLIC PANEL: CPT

## 2017-06-22 PROCEDURE — 84702 CHORIONIC GONADOTROPIN TEST: CPT

## 2017-06-22 PROCEDURE — 96374 THER/PROPH/DIAG INJ IV PUSH: CPT

## 2017-06-22 PROCEDURE — 82550 ASSAY OF CK (CPK): CPT

## 2017-06-22 PROCEDURE — 80061 LIPID PANEL: CPT

## 2017-06-22 PROCEDURE — 84484 ASSAY OF TROPONIN QUANT: CPT

## 2017-06-22 PROCEDURE — 84132 ASSAY OF SERUM POTASSIUM: CPT

## 2017-06-22 PROCEDURE — 83036 HEMOGLOBIN GLYCOSYLATED A1C: CPT

## 2017-06-22 PROCEDURE — 85610 PROTHROMBIN TIME: CPT

## 2017-06-22 PROCEDURE — 82947 ASSAY GLUCOSE BLOOD QUANT: CPT

## 2017-06-22 PROCEDURE — 82553 CREATINE MB FRACTION: CPT

## 2017-06-22 PROCEDURE — 99233 SBSQ HOSP IP/OBS HIGH 50: CPT

## 2017-06-22 PROCEDURE — 85730 THROMBOPLASTIN TIME PARTIAL: CPT

## 2017-06-22 PROCEDURE — 93880 EXTRACRANIAL BILAT STUDY: CPT | Mod: 26

## 2017-06-22 PROCEDURE — 82435 ASSAY OF BLOOD CHLORIDE: CPT

## 2017-06-22 PROCEDURE — 83605 ASSAY OF LACTIC ACID: CPT

## 2017-06-22 PROCEDURE — 84295 ASSAY OF SERUM SODIUM: CPT

## 2017-06-22 PROCEDURE — 93005 ELECTROCARDIOGRAM TRACING: CPT

## 2017-06-22 RX ORDER — DOCUSATE SODIUM 100 MG
100 CAPSULE ORAL
Qty: 0 | Refills: 0 | Status: DISCONTINUED | OUTPATIENT
Start: 2017-06-22 | End: 2017-06-22

## 2017-06-22 RX ORDER — LANOLIN ALCOHOL/MO/W.PET/CERES
3 CREAM (GRAM) TOPICAL ONCE
Qty: 0 | Refills: 0 | Status: DISCONTINUED | OUTPATIENT
Start: 2017-06-22 | End: 2017-06-22

## 2017-06-22 RX ORDER — DOCUSATE SODIUM 100 MG
1 CAPSULE ORAL
Qty: 0 | Refills: 0 | COMMUNITY
Start: 2017-06-22

## 2017-06-22 RX ADMIN — Medication 100 MILLIGRAM(S): at 14:47

## 2017-06-22 RX ADMIN — Medication 1 MILLIGRAM(S): at 01:28

## 2017-06-22 NOTE — PROGRESS NOTE ADULT - PROBLEM SELECTOR PLAN 1
Normal now. No evidence of acute CNS event on MRI. Likely related to medications.  May restart Wellbutrin if needed by psych. May d/c telemetry. She is stable from a cardiovascular standpoint for discharge to a psych facility/home if appropriate.
resolving  possible metabolic encephalopathy secondary to ingested meds  medically stable    states only prescription med is dalmane  MRI negative for CVA  would DC plavix as no indication  lipid panel pending
resolving  possible metabolic encephalopathy secondary to ingested meds (?amphetamines)  Pt denies any amphetamine use  states only prescription med is dalmane  MRI negative for CVA  would DC plavix as no indication  lipid panel pending
Normalizing. No evidence of acute CNS event on MRI. Likely related to medications.  May restart wellbutrin if needed.

## 2017-06-22 NOTE — DISCHARGE NOTE ADULT - PLAN OF CARE
to remain without worsened mental status changes Continue current medications, follow up with Dr. Robles and Dr. Mahan As above. As above Follow up with Cardiology/PMD for management Follow up with Dr. Robles and Dr. Mahan for management and re evaluation of home medications

## 2017-06-22 NOTE — PROGRESS NOTE BEHAVIORAL HEALTH - SUMMARY
65 y/o F with Cluster B personality, came in with AMS, denies overdose. Noted to be labile and depressed on the unit, but continues to deny suicidality or overdose,  also feels safer taking home, does not want to sign  2PC. Forced involuntary hospitalization would be of limited utility in case where both patient and spouse not willing, and patient appears more future oriented, continues to deny suicidality, strong element of personality - which would not be modified with inpt care. Both pt and spouse feel comfortable going home,  will hold meds and provide aide to watch. She will see her therapist and psychiatrist more frequently.
Pt is a 65 y/o MWF with hx of anxiety, presented on admission with +AMS, now improving. Pt was confused last night, per chart records, but today, AOA x3, denies depressed or anxious mood, coherent, and denies si/hi. Pt denies feeling hopeless and helpless, and denies her presentation to the hospital was suicide attempt. When asked about her urine being positive for amphetamines, pt did not have a response, denied outside substance abuse, and also per chart records from pt's  and psychiatrist, there is no known substance abuse problem.  Pt's therapist was contacted, Dr Mahan, who also reiterates that pt has never voiced ideas of self harm, has been motivated for DBT, and reports that patient's marriage has been difficult for many years, that she and  do not get along. Dr Mahan also reports that pt's  is usually "never present" for her.
66 year old woman with hx of anxiety, depression, osteoporosis, engaged in psychiatric tx, on anxiolytics which appear to be taken consistently via istop eval, collateral obtained from psychiatrist and ; unclear if current presentation of AMS is due to pharmacologic or neurologic component or perhaps combination of both. Pt's cardiac work-up appears reassuring, neurology is recommending MRI - this may further elucidate possible organic etiology.

## 2017-06-22 NOTE — DISCHARGE NOTE ADULT - PATIENT PORTAL LINK FT
“You can access the Follow[facility] Patient Portal, offered by [facility], by registering with the following website: [email or web address]”

## 2017-06-22 NOTE — PROGRESS NOTE BEHAVIORAL HEALTH - AXIS III
osteoporosis hx, past surgical hx of acquired cataract.

## 2017-06-22 NOTE — PROGRESS NOTE ADULT - PROBLEM SELECTOR PLAN 2
improving with withdrawal of psychiatric medications. Unclear source of acute event, likely medication induced.
monitor  off meds at this time
monitor  off meds at this time
improving with withdrawal of psychiatric medications. Unclear source of acute event.

## 2017-06-22 NOTE — PROGRESS NOTE ADULT - SUBJECTIVE AND OBJECTIVE BOX
HPI: Pt feels "foggy". No pain or dyspnea.  Got 1mg of ativan at midnight for anxiety.    The medical history is unchanged.  Review Of Systems:  No orthopnea or leg edema. The remainder of his ROS is unchanged.    Medications:    Allergies    No Known Allergies    Vitals:  T(F): 98, Max: 99.1 ( @ 20:11)  HR: 80 (76 - 84)  BP: 119/65 (108/67 - 124/77)  RR: 18 (18 - 18)  SpO2: 95% (94% - 96%)    Daily Weight in k.1 (2017 05:26)  I&O's Summary    I & Os for current day (as of 2017 09:52)  =============================================  IN: 720 ml / OUT: 600 ml / NET: 120 ml      Physical Exam:  Appearance: Pale, in NAD  HEENT: No JVD  Neurologic: Non-focal, alert, slow speech virgil, appears spacey  Skin: Warm and moist, no cyanosis                          13.8   6.8   )-----------( 145      ( 2017 08:47 )             41.8         139  |  102  |  7   ----------------------------<  111<H>  3.9   |  24  |  0.76    Ca    9.2      2017 08:47  Phos  1.6       Mg     2.4                   Total Cholesterol: 207  LDL: 84  HDL: 111  T    Hemoglobin A1C, Whole Blood: 5.1 % ( @ 07:24)      Interpretation of Telemetry:  ECG:  Echo:  Stress Testing:   Cath:  Imaging:

## 2017-06-22 NOTE — PROGRESS NOTE ADULT - ASSESSMENT
She is a 66 year old with acute change in mental status. No stroke on MRI. Prolonged QT has resolved and her mental state is slowly improving. No arrhythmias on tele.

## 2017-06-22 NOTE — PROGRESS NOTE BEHAVIORAL HEALTH - PRIMARY DX
Delirium due to multiple etiologies
Generalized anxiety disorder
Altered mental status, unspecified altered mental status type

## 2017-06-22 NOTE — PROGRESS NOTE ADULT - SUBJECTIVE AND OBJECTIVE BOX
Patient is a 66y old  Female who presents with a chief complaint of AMS x one day (22 Jun 2017 11:03)      SUBJECTIVE / OVERNIGHT EVENTS: No nausea, vomiting or diarrhea, no fever or chills, no dizziness or chest pain, no dysuria or hematuria, no jt pain or swelling    MEDICATIONS  (STANDING):    MEDICATIONS  (PRN):      Vital Signs Last 24 Hrs  T(C): 36.7, Max: 37.3 (06-21 @ 20:11)  HR: 80 (76 - 80)  BP: 119/65 (113/66 - 124/77)  RR: 18 (18 - 18)  SpO2: 95% (95% - 96%)  Wt(kg): --  CAPILLARY BLOOD GLUCOSE  96 (22 Jun 2017 11:33)  98 (22 Jun 2017 08:13)  103 (22 Jun 2017 05:10)  93 (22 Jun 2017 00:37)  90 (21 Jun 2017 21:31)  99 (21 Jun 2017 17:38)    I&O's Summary  I & Os for 24h ending 22 Jun 2017 07:00  =============================================  IN: 720 ml / OUT: 600 ml / NET: 120 ml    I & Os for current day (as of 22 Jun 2017 13:42)  =============================================  IN: 120 ml / OUT: 0 ml / NET: 120 ml    PHYSICAL EXAM:  GENERAL: NAD, well-developed  HEAD:  Atraumatic, Normocephalic  EYES: EOMI, PERRLA, conjunctiva and sclera clear  NECK: Supple, No JVD  CHEST/LUNG: Clear to auscultation bilaterally; No wheeze  HEART: Regular rate and rhythm; No murmurs, rubs, or gallops  ABDOMEN: Soft, Nontender, Nondistended; Bowel sounds present  EXTREMITIES:  2+ Peripheral Pulses, No clubbing, cyanosis, or edema  PSYCH: AAOx3 speech slow but appropriate  seems less alert and communicative than yesterday  NEUROLOGY: non-focal  SKIN: No rashes or lesions      LABS:                        13.8   6.8   )-----------( 145      ( 21 Jun 2017 08:47 )             41.8     06-21    139  |  102  |  7   ----------------------------<  111<H>  3.9   |  24  |  0.76    Ca    9.2      21 Jun 2017 08:47  Phos  1.6     06-21  Mg     2.4     06-21            Urinalysis Basic - ( 21 Jun 2017 22:46 )    Color: x / Appearance: x / SG: x / pH: x  Gluc: x / Ketone: x  / Bili: x / Urobili: x   Blood: x / Protein: x / Nitrite: x   Leuk Esterase: x / RBC: 0-2 /HPF / WBC 0-2 /HPF   Sq Epi: x / Non Sq Epi: x / Bacteria: x          Consultant(s) Notes Reviewed:      Care Discussed with Consultants/Other Providers:    Contact Number, Dr De La Garza [phone]

## 2017-06-22 NOTE — PROGRESS NOTE BEHAVIORAL HEALTH - NSBHCONSULTMEDS_PSY_A_CORE FT
Hold psych meds until outpt psychiatrist evaluates further. Pt has home supply of Dalmane and Xanax
1) consider d/c CIWA, and may d/c 1:1  2) while in the hospital, consider ativan 0.5 mg TID PRN; pt states she takes xanax 1 mg qdaily PRN at home, states she rarely takes it  3) no need for inpt psychiatric admission  4) pt to f/u with her outpt psychiatrist Dr Robles, and therapist, Dr Mahan for DBT therapy  5) pt psychiatrically cleared for d/c following medical clearance
none at this time; see above problem list

## 2017-06-22 NOTE — PROGRESS NOTE BEHAVIORAL HEALTH - NSBHCONSFOLLOWNEEDS_PSY_A_CORE
no psychiatric contraindications to discharge
no psychiatric contraindications to discharge
Patient needs further psychiatric safety assessment prior to discharge

## 2017-06-22 NOTE — DISCHARGE NOTE ADULT - CARE PROVIDER_API CALL
Zach Ayala), Family Medicine  [address] [place], [place] [id number]  Phone: [phone]  Fax: [phone]    Myron Robles), Psychiatry  [address] [address]  Phone: [phone]  Fax: [phone]

## 2017-06-22 NOTE — DISCHARGE NOTE ADULT - CARE PLAN
Principal Discharge DX:	Altered mental status  Goal:	to remain without worsened mental status changes  Instructions for follow-up, activity and diet:	Continue current medications, follow up with Dr. Robles and Dr. Mahan  Secondary Diagnosis:	Delirium due to multiple etiologies  Instructions for follow-up, activity and diet:	As above.  Secondary Diagnosis:	Depression  Instructions for follow-up, activity and diet:	As above  Secondary Diagnosis:	Long QT interval  Instructions for follow-up, activity and diet:	Follow up with Cardiology/PMD for management Principal Discharge DX:	Altered mental status  Goal:	to remain without worsened mental status changes  Instructions for follow-up, activity and diet:	Follow up with Dr. Robles and Dr. Mahan for management and re evaluation of home medications  Secondary Diagnosis:	Delirium due to multiple etiologies  Instructions for follow-up, activity and diet:	As above.  Secondary Diagnosis:	Depression  Instructions for follow-up, activity and diet:	As above  Secondary Diagnosis:	Long QT interval  Instructions for follow-up, activity and diet:	Follow up with Cardiology/PMD for management

## 2017-06-22 NOTE — PROGRESS NOTE BEHAVIORAL HEALTH - RISK ASSESSMENT
Pt is a chronic risk due to Cluster B personality, suspected (but not proven) recent overdose, limited social supports, but this risk is attenuated by current denial of suicidality, future orientedness, no prior attempts, no reported depression at present from her psychiatric providers, female, white. Pt definitely wanting more support from  in context of marital strife, and hospitalization and some of her statements seem to be aimed at getting more of his concern directed towards her. Do not feel inpatient hospitalization would be beneficial in altering this dynamic.
Pt denies si/hi, and denies past hx of self injurious behaviors or suicide attempts. Per pt's outpt psychiatrist, pt has no hx of self injurious behaviors or suicide attempts. Pt with no past inpt psychiatric admissions.
pt is elevated risk at this time due to AMS, inability to care for self, PMA- would continue with 1:1.

## 2017-06-22 NOTE — PROGRESS NOTE BEHAVIORAL HEALTH - NSBHFUPINTERVALHXFT_PSY_A_CORE
Pt seen indvidually, also with  Dr Ayala for approx 30 mins. Case discussed with her marital therapist Dr Ochoa ([phone]) and Dr Galeas ([phone]) as well as with Dr De La Garza and nursing staff.    Dr Ochoa, Dr Galeas both reported that they had seen pt recently, not noted her to be suicidal or depressed. Dr Ochoa reported pt has very difficult relationship with , borderline traits.    Pt reports she is upset with  for turning children against her, felt overwhelmed with "abject grief", but categorically denied being suicidal, denied overdose. Declined voluntary admission.    Dr Ayala, pt's , initially reported pt had made some vague statements to him such as "I'm dying" that made him concerned about how he could manage her at home. He was however reluctant to sign application for [facility].    Had session with pt and her , pt noted to make statements that she wants "children to be good to me while I'm still alive", but this appeared to me in keeping with somewhat dramatic presentation. She remained future oriented, wanting  to make things right with children, telling him, "I want you, I want you," meaning that she wants more support from him.  felt comfortable taking pt home.

## 2017-06-22 NOTE — PROGRESS NOTE BEHAVIORAL HEALTH - NSBHATTESTSEENBY_PSY_A_CORE
attending Psychiatrist without NP/Trainee
attending Psychiatrist without NP/Trainee
Trainee with telephonic supervision from Attending Psychiatrist

## 2017-06-22 NOTE — DISCHARGE NOTE ADULT - MEDICATION SUMMARY - MEDICATIONS TO TAKE
I will START or STAY ON the medications listed below when I get home from the hospital:    MONTELUKAST SODIUM 10 MG TABS  -- 1 tab(s) by mouth once a day  -- Indication: For Allergy    FLUTICASONE PROPIONATE 50 MCG/ACT SUSP  -- 2 spray(s) into nose 2 times a day  -- Indication: For Allergy I will START or STAY ON the medications listed below when I get home from the hospital:    docusate sodium 100 mg oral capsule  -- 1 cap(s) by mouth 2 times a day  -- Indication: For Stool softner     MONTELUKAST SODIUM 10 MG TABS  -- 1 tab(s) by mouth once a day  -- Indication: For Allergy    FLUTICASONE PROPIONATE 50 MCG/ACT SUSP  -- 2 spray(s) into nose 2 times a day  -- Indication: For Allergy

## 2017-06-22 NOTE — DISCHARGE NOTE ADULT - HOSPITAL COURSE
to be completed by MD 66 F w/pmh  anxiety and depression, presents for altered mental state. Aphasic on exam with myoclonic jerks,  CTH negative for acute bleed or infarct , EKG with prolonged QTC, Utox positive for benzodiazapine and amphetamines.   Admit  Dx  Altered Mental  status    Utox positive for benzodiazapine and amphetamines. suspect presentation related to intoxication and possible overdose specifically to amphetamines  6/19: seen by psych:  cont /w ciwa protocol and constant observation           seen by tox-            seen by neurology  6/20 Psych asked to reevaluate  pt (requested by Dr. De La Garza).  6/21: seen by neuro: mri no infarct           statin ordered.  f/u lipid panel           as per psych dc 1:1, poss dc of [facility] shawn    6/19: CT head: No acute intracranial hemorrhage, mass effect, or CT evidence of an acute vascular territorial infarct.  Minimal chronic ischemic changes in the frontoparietal white matter.    6/21: MRI head:   No acute hemorrhage or infarct.  Age-appropriate involutional changes.    statin and plavix discontinued    patient and  offered inpatient psych hospitalization  declined, discharged home at husbands request and to his care  cleared by psych for DC home to husbands care

## 2017-06-22 NOTE — PROGRESS NOTE ADULT - ATTENDING COMMENTS
discussed with psych attending in detail  I will defer dc planning to psych and  discussion discussed with psych attending in detail  I will defer dc planning to psych and  discussion    Addendum:    D/W Dr Cordero psych attending and  at bedside.  wishes to take patient home on his responsibility. Declines to sign 2 PC form.  Will ensure safety at home instead. Cleared by psych to be discontinued home off psych meds

## 2017-06-23 LAB
-  AMPICILLIN: SIGNIFICANT CHANGE UP
-  CIPROFLOXACIN: SIGNIFICANT CHANGE UP
-  NITROFURANTOIN: SIGNIFICANT CHANGE UP
-  TETRACYCLINE: SIGNIFICANT CHANGE UP
-  VANCOMYCIN: SIGNIFICANT CHANGE UP
CULTURE RESULTS: SIGNIFICANT CHANGE UP
METHOD TYPE: SIGNIFICANT CHANGE UP
ORGANISM # SPEC MICROSCOPIC CNT: SIGNIFICANT CHANGE UP
ORGANISM # SPEC MICROSCOPIC CNT: SIGNIFICANT CHANGE UP
SPECIMEN SOURCE: SIGNIFICANT CHANGE UP

## 2017-09-19 NOTE — BEHAVIORAL HEALTH ASSESSMENT NOTE - NS ED BHA MED ROS EYES
HISTORY & PHYSICAL


H&P


Lori Pineda [id number] 1948 09/01/2017 01:50 PM 1/1 


 [facility], [facility]


 OUR PATIENTS COME FIRST


 [address] [place] [address]   


 Ph. [phone]     








Patient:      Lori Pineda


YOB: 1948   


Date:                         09/01/2017 1:50 PM 


Visit Type:                 Consult





This 69 year old female presents for Diarrhea and H/o colorectal polyp.





History of Present Illness:


1.  Diarrhea 


The patient describes it as loose and watery. Pertinent negatives include 

abdominal pain, nausea, vomiting and weight loss. Additional information: Has 

episodes of diarrhea. No rectal bleeding. No fever.


2.  H/o colorectal polyp 


Prior screening:  colonoscopy.  Risk Factors: colon polyp 2008.  Associated 

symptoms include diarrhea.  Pertinent negatives include abdominal pain, change 

in bowel habits, change in stool caliber, constipation, decreased appetite, 

melena, nausea, rectal bleeding, vomiting, weight gain and weight loss.  

Additional information: No family history of colon cancer, No family history of 

Crohn's/colitis, No NSAID/ASA use and Had colon polyp in 2008.





INTAKE COMMENTS:


Intake Comments: Nurse Note: the pt is here today with complaints of diarrhea, 

the pt's last colonoscopy was in 2008 and she had polyps at that time.





PROBLEM LIST:











Problem Description Onset Date  


 


Tendinitis of left hip flexor 01/26/2016  


 


Lumbar degenerative disc disease 03/02/2016  


 


Depressive disorder 09/14/2012  


 


Osteoarthritis of shoulders due to rotator cuff injury, bilateral 09/29/2016  


 


Diarrhea, unspecified type 08/16/2016  


 


Restless legs 11/04/2014  


 


Status post total left knee replacement 11/06/2015  


 


Chronic pain 07/10/2013  


 


Multiple myeloma 12/28/2012  


 


Osteoarthritis 01/30/2015  


 


Tendinitis of left shoulder 02/02/2016  


 


Spondylosis of lumbar region without myelopathy or radiculopathy 02/02/2016  


 


Major depressive disorder with single episode, in partial remission 07/26/2016  


 


Peripheral neuropathy due to chemotherapy 01/05/2016  


 


Pain 01/19/2016  


 


Multiple myeloma in remission 04/15/2010  


 


Degenerative joint disease involving multiple joints 04/15/2010  


 


Idiopathic peripheral neuropathy 04/15/2010  


 


Periodic limb movement disorder 11/20/2012  


 


Hyperlipidemia 03/11/2011  








PAST MEDICAL/SURGICAL HISTORY   (Detailed)











Disease/disorder Onset Date Management Date Comments


 


  Bilateral tubal ligation  


 


  cervical diskectomy 2011 


 


  Arthrocentesis of the left shoulder joint  


 


Anxiety    


 


cellulitis of finger 2010   


 


Depression    


 


GERD    


 


Hyperlipidemia NEC/NOS    


 


hypertension    


 


multiple myeloma    


 


Multiple myeloma, in remission    


 


Osteoarthritis    


 


osteoarthritis  left TKA 10/06/2015 


 


Osteoarthrosis, Gen- Unsp Site    


 


peripheral neuropathy    


 


Unspecified idiopathic peripheral neuropathy    


 


Uterine Prolapse surgery 2014   








DIAGNOSTICS HISTORY:











Test Ordered Interpretation Result completed


 


X-RAY EXAM OF PELVIS Left 09/30/2014  patient given order and will have it done 

at an outpatient facility. 09/30/2014














Test Ordered Ordering Comments Modifier


 


X-RAY EXAM OF PELVIS Left 09/30/2014  








Patient is postmenopausal. 





Medications (Active):











Started Medication Directions Instruction Stopped


 


03/02/2016 aspirin 81 mg tablet,delayed release take 1 tablet by oral route  

every day  


 


08/23/2017 fentanyl 75 mcg/hr transdermal patch apply 1 patch by transdermal 

route  every 72 hours DX: 338.29 , 203.00. - Chronic Pain G89.29 


 


06/14/2017 gabapentin 300 mg capsule take 1 capsule by oral route 2 times every 

day  


 


09/01/2015 GLUCOSAMINE SULFATE 1 bid  


 


09/01/2017 lorazepam 0.5 mg tablet 1 po bid prn  for anxiety Must last 30 Days. 


 


09/01/2015 multivitamin tablet take 1 tablet by ORAL route  every day with food

  


 


04/07/2017 omeprazole 20 mg capsule,delayed release 1 PO DAILY  


 


06/28/2017 oxycodone 10 mg tablet take 1 tablet by oral route TID PRN pain 

chronic pain  G89.29 . new dose - 


 


08/08/2017 potassium chloride ER 10 mEq capsule,extended release TAKE 2 CAPSULE 

BY ORAL ROUTE EVERY DAY WITH FOOD  


 


07/26/2016 Requip 2 mg tablet 1 po in am and 2 po every hs new sig. 11/13/15 


 


07/07/2017 sertraline 100 mg tablet TAKE 1 AND 1/2 TABLETS DAILY - TOTAL DOSE 

150MG DAILY  


 


05/02/2017 Tylenol Arthritis Pain 650 mg tablet,extended release take 1 Tablet 

by oral route  every 4 hours as needed - NO MORE than QID  


 


11/12/2014 Vitamin D3 2,000 unit capsule 1 daily  








Allergies:











Ingredient Reaction Medication Name Comment


 


METOCLOPRAMIDE HCL muscle tremor Reglan 


 


POTASSIUM CLAVULANATE Diarrhea Augmentin 


 


AMOXICILLIN TRIHYDRATE Diarrhea Augmentin 











REVIEW OF SYSTEMS











System Neg/Pos Details


 


Constitutional Negative Chills, fever, malaise, weight gain and weight loss.


 


ENMT Negative Sore throat.


 


Eyes Negative Double vision.


 


Respiratory Negative Dyspnea and wheezing.


 


Cardio Negative Chest pain and irregular heartbeat/palpitations.


 


GI Positive Diarrhea, See HPI.


 


GI Negative Abdominal pain, change in bowel habits, change in stool caliber, 

constipation, decreased appetite, melena, nausea, see HPI, rectal bleeding and 

vomiting.


 


 Negative Dysuria and hematuria.


 


Endocrine Negative Cold intolerance and heat intolerance.


 


Psych Negative Anxiety.


 


Integumentary Negative Hives and rash.


 


MS Negative Joint pain.


 


Hema/Lymph Negative Easy bleeding and easy bruising.


 


Allergic/Immuno Negative Food allergies.


 


Reproductive Positive The patient is post-menopausal.











VITAL SIGNS 











Time BP mm/Hg Pulse /min Resp /min Temp F Ht ft Ht in Ht cm Wt lb Wt kg BMI kg/

m2 BSA m2 O2 Sat%


 


10:55 /78 116  98.6 0.0 60.00 152.40 122.60 55.610 23.94  97








COMMENTS











Time Comments


 


10:55 AM PCP obtained vitals today.








MEASURED BY











Time Measured by


 


10:55 AM Natividad Jon








PHYSICAL EXAM:











Exam Findings Details


 


Constitutional Normal Well developed.


 


Eyes Normal Conjunctiva - Right: Normal, Left: Normal. Sclera - Right: Normal, 

Left: Normal.


 


Nasopharynx Normal Lips/teeth/gums - Normal.


 


Neck Exam Normal Inspection - Normal. Thyroid gland - Normal.


 


Respiratory Normal Inspection - Normal. Auscultation - Normal.


 


Cardiovascular Normal Regular rate and rhythm. No murmurs, gallops, or rubs.


 


Vascular Normal Pulses - Carotids: Normal, Femoral: Normal, Dorsalis pedis: 

Normal.


 


Abdomen Normal Inspection - Normal. Anterior palpation - No guarding. No 

abdominal tenderness. No hepatic enlargement. No splenic enlargement. No 

hernia. No Ascites.


 


Skin Normal Inspection - Normal.


 


Extremity Normal No edema.


 


Psychiatric Normal Oriented to time, place, person, and situation. Appropriate 

mood and effect.














# Detail Type Description


 


 1. Assessment Functional diarrhea (K59.1).


 


 Patient Plan Await colonoscopy


 


  


 


 2. Assessment History of colon polyps (Z86.010).


 


 Patient [place]  [place] [facility] [place]


 


 Plan Orders Further diagnostic evaluations ordered today include(s) 

Colonoscopy to be performed today.  She is to schedule a follow-up visit with 

London Cao MD upon completion of work-up








Electronically signed by:  London Cao MD  09/01/2017 11:26 AM


Document generated by:  London Cao 09/01/2017 11:26 AM


                      Fartun Khan MD, Family Practice;  Dank Seo MD 

Internal Medicine; Jamia Vinson MD, Internal Medicine; 


 Sola Cao MD Internal Medicine; London Cao MD, Gastroenterology; 


  Misbah Cope MD, Rheumatology, S. iMke Rasmussen, Physical Medicine/Rehab 


 J. Monse DUNAWAY








--------------------------------------------------------------------------------

--------------------------------------------------------------------------------

------








9/19/17





Patient seen and examined. No change in H&P











LONDON CAO MD Sep 19, 2017 10:07 Unable to assess

## 2017-10-02 ENCOUNTER — OTHER (OUTPATIENT)
Age: 67
End: 2017-10-02

## 2017-10-02 ENCOUNTER — APPOINTMENT (OUTPATIENT)
Dept: RHEUMATOLOGY | Facility: CLINIC | Age: 67
End: 2017-10-02
Payer: MEDICARE

## 2017-10-02 VITALS
HEIGHT: 68 IN | TEMPERATURE: 99.2 F | DIASTOLIC BLOOD PRESSURE: 71 MMHG | OXYGEN SATURATION: 96 % | HEART RATE: 86 BPM | WEIGHT: 111 LBS | SYSTOLIC BLOOD PRESSURE: 111 MMHG | BODY MASS INDEX: 16.82 KG/M2

## 2017-10-02 PROCEDURE — 96372 THER/PROPH/DIAG INJ SC/IM: CPT

## 2017-10-02 PROCEDURE — 99213 OFFICE O/P EST LOW 20 MIN: CPT | Mod: 25

## 2017-10-02 RX ORDER — DENOSUMAB 60 MG/ML
60 INJECTION SUBCUTANEOUS
Qty: 0 | Refills: 0 | Status: COMPLETED | OUTPATIENT
Start: 2017-10-02

## 2017-10-02 RX ADMIN — DENOSUMAB 0 MG/ML: 60 INJECTION SUBCUTANEOUS at 00:00

## 2018-04-02 ENCOUNTER — APPOINTMENT (OUTPATIENT)
Dept: RHEUMATOLOGY | Facility: CLINIC | Age: 68
End: 2018-04-02

## 2018-04-18 ENCOUNTER — APPOINTMENT (OUTPATIENT)
Dept: RHEUMATOLOGY | Facility: CLINIC | Age: 68
End: 2018-04-18
Payer: MEDICARE

## 2018-04-18 PROCEDURE — 96372 THER/PROPH/DIAG INJ SC/IM: CPT

## 2018-04-18 RX ORDER — DENOSUMAB 60 MG/ML
60 INJECTION SUBCUTANEOUS
Qty: 0 | Refills: 0 | Status: COMPLETED | OUTPATIENT
Start: 2018-04-18

## 2018-04-18 RX ADMIN — DENOSUMAB 0 MG/ML: 60 INJECTION SUBCUTANEOUS at 00:00

## 2018-04-23 ENCOUNTER — APPOINTMENT (OUTPATIENT)
Dept: ENDOCRINOLOGY | Facility: CLINIC | Age: 68
End: 2018-04-23
Payer: MEDICARE

## 2018-04-23 VITALS — HEIGHT: 67.5 IN | WEIGHT: 114 LBS | BODY MASS INDEX: 17.68 KG/M2

## 2018-04-23 PROCEDURE — 77080 DXA BONE DENSITY AXIAL: CPT | Mod: GA

## 2018-05-15 ENCOUNTER — OTHER (OUTPATIENT)
Age: 68
End: 2018-05-15

## 2018-07-24 ENCOUNTER — OTHER (OUTPATIENT)
Age: 68
End: 2018-07-24

## 2018-08-01 ENCOUNTER — APPOINTMENT (OUTPATIENT)
Dept: RHEUMATOLOGY | Facility: CLINIC | Age: 68
End: 2018-08-01
Payer: MEDICARE

## 2018-08-01 VITALS
OXYGEN SATURATION: 95 % | HEIGHT: 67.5 IN | HEART RATE: 79 BPM | WEIGHT: 113 LBS | SYSTOLIC BLOOD PRESSURE: 105 MMHG | BODY MASS INDEX: 17.53 KG/M2 | DIASTOLIC BLOOD PRESSURE: 71 MMHG

## 2018-08-01 PROCEDURE — 99213 OFFICE O/P EST LOW 20 MIN: CPT

## 2018-08-02 PROBLEM — F48.9 NONPSYCHOTIC MENTAL DISORDER, UNSPECIFIED: Chronic | Status: ACTIVE | Noted: 2017-06-19

## 2018-08-02 PROBLEM — M81.0 AGE-RELATED OSTEOPOROSIS WITHOUT CURRENT PATHOLOGICAL FRACTURE: Chronic | Status: ACTIVE | Noted: 2017-06-20

## 2018-08-02 PROBLEM — F41.9 ANXIETY DISORDER, UNSPECIFIED: Chronic | Status: ACTIVE | Noted: 2017-06-20

## 2018-08-07 LAB
25(OH)D3 SERPL-MCNC: 50.9 NG/ML
ALBUMIN SERPL ELPH-MCNC: 4.7 G/DL
ALP BLD-CCNC: 58 U/L
ALT SERPL-CCNC: 31 U/L
ANION GAP SERPL CALC-SCNC: 12 MMOL/L
AST SERPL-CCNC: 32 U/L
BASOPHILS # BLD AUTO: 0.01 K/UL
BASOPHILS NFR BLD AUTO: 0.2 %
BILIRUB SERPL-MCNC: 0.2 MG/DL
BUN SERPL-MCNC: 22 MG/DL
CALCIUM SERPL-MCNC: 9.4 MG/DL
CHLORIDE SERPL-SCNC: 96 MMOL/L
CO2 SERPL-SCNC: 28 MMOL/L
CREAT SERPL-MCNC: 0.97 MG/DL
CRP SERPL-MCNC: 0.17 MG/DL
EOSINOPHIL # BLD AUTO: 0.06 K/UL
EOSINOPHIL NFR BLD AUTO: 1 %
GLUCOSE SERPL-MCNC: 97 MG/DL
HCT VFR BLD CALC: 42 %
HGB BLD-MCNC: 13 G/DL
IMM GRANULOCYTES NFR BLD AUTO: 0.3 %
LYMPHOCYTES # BLD AUTO: 1.7 K/UL
LYMPHOCYTES NFR BLD AUTO: 28.7 %
MAN DIFF?: NORMAL
MCHC RBC-ENTMCNC: 30.2 PG
MCHC RBC-ENTMCNC: 31 GM/DL
MCV RBC AUTO: 97.4 FL
MONOCYTES # BLD AUTO: 0.55 K/UL
MONOCYTES NFR BLD AUTO: 9.3 %
NEUTROPHILS # BLD AUTO: 3.58 K/UL
NEUTROPHILS NFR BLD AUTO: 60.5 %
PLATELET # BLD AUTO: 171 K/UL
POTASSIUM SERPL-SCNC: 4.6 MMOL/L
PROT SERPL-MCNC: 6.8 G/DL
RBC # BLD: 4.31 M/UL
RBC # FLD: 14.4 %
SODIUM SERPL-SCNC: 137 MMOL/L
WBC # FLD AUTO: 5.92 K/UL

## 2018-10-29 ENCOUNTER — APPOINTMENT (OUTPATIENT)
Dept: RHEUMATOLOGY | Facility: CLINIC | Age: 68
End: 2018-10-29

## 2018-11-05 ENCOUNTER — APPOINTMENT (OUTPATIENT)
Dept: RHEUMATOLOGY | Facility: CLINIC | Age: 68
End: 2018-11-05
Payer: MEDICARE

## 2018-11-05 VITALS
HEART RATE: 78 BPM | OXYGEN SATURATION: 95 % | TEMPERATURE: 99 F | SYSTOLIC BLOOD PRESSURE: 114 MMHG | WEIGHT: 114 LBS | HEIGHT: 67.5 IN | BODY MASS INDEX: 17.68 KG/M2 | DIASTOLIC BLOOD PRESSURE: 70 MMHG

## 2018-11-05 PROCEDURE — 96372 THER/PROPH/DIAG INJ SC/IM: CPT

## 2018-11-05 PROCEDURE — 99213 OFFICE O/P EST LOW 20 MIN: CPT | Mod: 25

## 2018-11-05 RX ORDER — DENOSUMAB 60 MG/ML
60 INJECTION SUBCUTANEOUS
Qty: 1 | Refills: 0 | Status: COMPLETED | OUTPATIENT
Start: 2018-11-05

## 2018-11-05 RX ADMIN — DENOSUMAB 0 MG/ML: 60 INJECTION SUBCUTANEOUS at 00:00

## 2018-11-06 LAB
25(OH)D3 SERPL-MCNC: 48.8 NG/ML
ALBUMIN SERPL ELPH-MCNC: 4.7 G/DL
ALP BLD-CCNC: 61 U/L
ALT SERPL-CCNC: 26 U/L
ANION GAP SERPL CALC-SCNC: 11 MMOL/L
AST SERPL-CCNC: 28 U/L
BASOPHILS # BLD AUTO: 0.01 K/UL
BASOPHILS NFR BLD AUTO: 0.2 %
BILIRUB SERPL-MCNC: 0.4 MG/DL
BUN SERPL-MCNC: 15 MG/DL
CALCIUM SERPL-MCNC: 10 MG/DL
CHLORIDE SERPL-SCNC: 99 MMOL/L
CO2 SERPL-SCNC: 30 MMOL/L
CREAT SERPL-MCNC: 0.98 MG/DL
EOSINOPHIL # BLD AUTO: 0.06 K/UL
EOSINOPHIL NFR BLD AUTO: 1.1 %
GLUCOSE SERPL-MCNC: 97 MG/DL
HCT VFR BLD CALC: 42.2 %
HGB BLD-MCNC: 13.6 G/DL
IMM GRANULOCYTES NFR BLD AUTO: 0.2 %
LYMPHOCYTES # BLD AUTO: 1.35 K/UL
LYMPHOCYTES NFR BLD AUTO: 23.7 %
MAGNESIUM SERPL-MCNC: 2.5 MG/DL
MAN DIFF?: NORMAL
MCHC RBC-ENTMCNC: 31.1 PG
MCHC RBC-ENTMCNC: 32.2 GM/DL
MCV RBC AUTO: 96.6 FL
MONOCYTES # BLD AUTO: 0.56 K/UL
MONOCYTES NFR BLD AUTO: 9.8 %
NEUTROPHILS # BLD AUTO: 3.7 K/UL
NEUTROPHILS NFR BLD AUTO: 65 %
PHOSPHATE SERPL-MCNC: 4.3 MG/DL
PLATELET # BLD AUTO: 183 K/UL
POTASSIUM SERPL-SCNC: 4.9 MMOL/L
PROT SERPL-MCNC: 7 G/DL
RBC # BLD: 4.37 M/UL
RBC # FLD: 14.6 %
SODIUM SERPL-SCNC: 140 MMOL/L
WBC # FLD AUTO: 5.69 K/UL

## 2019-05-06 ENCOUNTER — APPOINTMENT (OUTPATIENT)
Dept: RHEUMATOLOGY | Facility: CLINIC | Age: 69
End: 2019-05-06
Payer: MEDICARE

## 2019-05-06 ENCOUNTER — OTHER (OUTPATIENT)
Age: 69
End: 2019-05-06

## 2019-05-06 VITALS
HEIGHT: 67.5 IN | BODY MASS INDEX: 18 KG/M2 | OXYGEN SATURATION: 96 % | RESPIRATION RATE: 16 BRPM | DIASTOLIC BLOOD PRESSURE: 68 MMHG | SYSTOLIC BLOOD PRESSURE: 121 MMHG | TEMPERATURE: 98.4 F | HEART RATE: 74 BPM | WEIGHT: 116 LBS

## 2019-05-06 PROCEDURE — 96372 THER/PROPH/DIAG INJ SC/IM: CPT

## 2019-05-06 PROCEDURE — 99213 OFFICE O/P EST LOW 20 MIN: CPT | Mod: 25

## 2019-05-06 RX ORDER — DENOSUMAB 60 MG/ML
60 INJECTION SUBCUTANEOUS
Qty: 1 | Refills: 0 | Status: COMPLETED | OUTPATIENT
Start: 2019-05-06

## 2019-05-06 RX ADMIN — DENOSUMAB 0 MG/ML: 60 INJECTION SUBCUTANEOUS at 00:00

## 2019-10-21 ENCOUNTER — APPOINTMENT (OUTPATIENT)
Dept: RHEUMATOLOGY | Facility: CLINIC | Age: 69
End: 2019-10-21

## 2019-10-21 ENCOUNTER — OTHER (OUTPATIENT)
Age: 69
End: 2019-10-21

## 2019-11-06 ENCOUNTER — APPOINTMENT (OUTPATIENT)
Dept: RHEUMATOLOGY | Facility: CLINIC | Age: 69
End: 2019-11-06

## 2019-11-08 ENCOUNTER — APPOINTMENT (OUTPATIENT)
Dept: RHEUMATOLOGY | Facility: CLINIC | Age: 69
End: 2019-11-08
Payer: MEDICARE

## 2019-11-08 ENCOUNTER — MED ADMIN CHARGE (OUTPATIENT)
Age: 69
End: 2019-11-08

## 2019-11-08 VITALS
HEIGHT: 67.5 IN | TEMPERATURE: 98.5 F | SYSTOLIC BLOOD PRESSURE: 112 MMHG | DIASTOLIC BLOOD PRESSURE: 73 MMHG | OXYGEN SATURATION: 96 % | HEART RATE: 75 BPM | BODY MASS INDEX: 17.84 KG/M2 | WEIGHT: 115 LBS

## 2019-11-08 PROCEDURE — 96372 THER/PROPH/DIAG INJ SC/IM: CPT

## 2019-11-08 RX ORDER — DENOSUMAB 60 MG/ML
60 INJECTION SUBCUTANEOUS
Qty: 1 | Refills: 0 | Status: COMPLETED | OUTPATIENT
Start: 2019-11-08

## 2019-11-08 RX ADMIN — DENOSUMAB 0 MG/ML: 60 INJECTION SUBCUTANEOUS at 00:00

## 2019-11-12 RX ORDER — DENOSUMAB 60 MG/ML
60 INJECTION SUBCUTANEOUS
Qty: 1 | Refills: 0 | Status: COMPLETED | OUTPATIENT
Start: 2019-11-12

## 2019-11-12 RX ADMIN — DENOSUMAB 0 MG/ML: 60 INJECTION SUBCUTANEOUS at 00:00

## 2020-05-15 ENCOUNTER — APPOINTMENT (OUTPATIENT)
Dept: ENDOCRINOLOGY | Facility: CLINIC | Age: 70
End: 2020-05-15
Payer: MEDICARE

## 2020-05-15 ENCOUNTER — APPOINTMENT (OUTPATIENT)
Dept: RHEUMATOLOGY | Facility: CLINIC | Age: 70
End: 2020-05-15

## 2020-05-15 VITALS — BODY MASS INDEX: 18.09 KG/M2 | WEIGHT: 118 LBS | HEIGHT: 67.6 IN

## 2020-05-15 VITALS — TEMPERATURE: 98.6 F

## 2020-05-15 PROCEDURE — 77080 DXA BONE DENSITY AXIAL: CPT | Mod: GA

## 2020-05-18 ENCOUNTER — APPOINTMENT (OUTPATIENT)
Dept: RHEUMATOLOGY | Facility: CLINIC | Age: 70
End: 2020-05-18

## 2020-05-21 ENCOUNTER — APPOINTMENT (OUTPATIENT)
Dept: RHEUMATOLOGY | Facility: CLINIC | Age: 70
End: 2020-05-21
Payer: MEDICARE

## 2020-05-21 PROCEDURE — 96372 THER/PROPH/DIAG INJ SC/IM: CPT

## 2020-09-08 ENCOUNTER — APPOINTMENT (OUTPATIENT)
Dept: ORTHOPEDIC SURGERY | Facility: CLINIC | Age: 70
End: 2020-09-08
Payer: MEDICARE

## 2020-09-08 VITALS
BODY MASS INDEX: 17.84 KG/M2 | DIASTOLIC BLOOD PRESSURE: 75 MMHG | HEART RATE: 71 BPM | WEIGHT: 115 LBS | HEIGHT: 67.5 IN | SYSTOLIC BLOOD PRESSURE: 117 MMHG

## 2020-09-08 VITALS — TEMPERATURE: 97.3 F

## 2020-09-08 DIAGNOSIS — M54.16 RADICULOPATHY, LUMBAR REGION: ICD-10-CM

## 2020-09-08 DIAGNOSIS — M41.9 SCOLIOSIS, UNSPECIFIED: ICD-10-CM

## 2020-09-08 DIAGNOSIS — Z87.39 PERSONAL HISTORY OF OTHER DISEASES OF THE MUSCULOSKELETAL SYSTEM AND CONNECTIVE TISSUE: ICD-10-CM

## 2020-09-08 PROCEDURE — 99204 OFFICE O/P NEW MOD 45 MIN: CPT

## 2020-09-08 PROCEDURE — 72100 X-RAY EXAM L-S SPINE 2/3 VWS: CPT

## 2020-09-21 ENCOUNTER — APPOINTMENT (OUTPATIENT)
Dept: MRI IMAGING | Facility: CLINIC | Age: 70
End: 2020-09-21

## 2020-09-30 ENCOUNTER — RESULT REVIEW (OUTPATIENT)
Age: 70
End: 2020-09-30

## 2020-09-30 ENCOUNTER — APPOINTMENT (OUTPATIENT)
Dept: MRI IMAGING | Facility: CLINIC | Age: 70
End: 2020-09-30
Payer: MEDICARE

## 2020-09-30 ENCOUNTER — OUTPATIENT (OUTPATIENT)
Dept: OUTPATIENT SERVICES | Facility: HOSPITAL | Age: 70
LOS: 1 days | End: 2020-09-30
Payer: MEDICARE

## 2020-09-30 DIAGNOSIS — Z00.8 ENCOUNTER FOR OTHER GENERAL EXAMINATION: ICD-10-CM

## 2020-09-30 DIAGNOSIS — H26.9 UNSPECIFIED CATARACT: Chronic | ICD-10-CM

## 2020-09-30 PROCEDURE — 72148 MRI LUMBAR SPINE W/O DYE: CPT | Mod: 26

## 2020-09-30 PROCEDURE — 72148 MRI LUMBAR SPINE W/O DYE: CPT

## 2020-10-14 ENCOUNTER — APPOINTMENT (OUTPATIENT)
Dept: RADIOLOGY | Facility: CLINIC | Age: 70
End: 2020-10-14
Payer: MEDICARE

## 2020-10-14 ENCOUNTER — OUTPATIENT (OUTPATIENT)
Dept: OUTPATIENT SERVICES | Facility: HOSPITAL | Age: 70
LOS: 1 days | End: 2020-10-14
Payer: MEDICARE

## 2020-10-14 DIAGNOSIS — H26.9 UNSPECIFIED CATARACT: Chronic | ICD-10-CM

## 2020-10-14 DIAGNOSIS — Z00.8 ENCOUNTER FOR OTHER GENERAL EXAMINATION: ICD-10-CM

## 2020-10-14 PROCEDURE — 73502 X-RAY EXAM HIP UNI 2-3 VIEWS: CPT | Mod: 26,RT

## 2020-10-14 PROCEDURE — 73502 X-RAY EXAM HIP UNI 2-3 VIEWS: CPT

## 2020-11-19 ENCOUNTER — APPOINTMENT (OUTPATIENT)
Dept: RHEUMATOLOGY | Facility: CLINIC | Age: 70
End: 2020-11-19
Payer: MEDICARE

## 2020-11-19 VITALS
OXYGEN SATURATION: 97 % | DIASTOLIC BLOOD PRESSURE: 68 MMHG | HEART RATE: 69 BPM | RESPIRATION RATE: 16 BRPM | BODY MASS INDEX: 17.84 KG/M2 | WEIGHT: 115 LBS | TEMPERATURE: 97.6 F | SYSTOLIC BLOOD PRESSURE: 106 MMHG | HEIGHT: 67.5 IN

## 2020-11-19 PROCEDURE — 96372 THER/PROPH/DIAG INJ SC/IM: CPT

## 2020-11-19 RX ORDER — DENOSUMAB 60 MG/ML
60 INJECTION SUBCUTANEOUS
Qty: 1 | Refills: 0 | Status: COMPLETED | OUTPATIENT
Start: 2020-11-19

## 2020-11-19 RX ADMIN — DENOSUMAB 0 MG/ML: 60 INJECTION SUBCUTANEOUS at 00:00

## 2021-05-20 ENCOUNTER — APPOINTMENT (OUTPATIENT)
Dept: RHEUMATOLOGY | Facility: CLINIC | Age: 71
End: 2021-05-20
Payer: MEDICARE

## 2021-05-20 VITALS
TEMPERATURE: 96.1 F | BODY MASS INDEX: 18.61 KG/M2 | HEART RATE: 71 BPM | RESPIRATION RATE: 16 BRPM | WEIGHT: 120 LBS | DIASTOLIC BLOOD PRESSURE: 79 MMHG | OXYGEN SATURATION: 95 % | SYSTOLIC BLOOD PRESSURE: 124 MMHG | HEIGHT: 67.5 IN

## 2021-05-20 PROCEDURE — 96372 THER/PROPH/DIAG INJ SC/IM: CPT

## 2021-05-20 RX ORDER — DENOSUMAB 60 MG/ML
60 INJECTION SUBCUTANEOUS
Qty: 1 | Refills: 0 | Status: COMPLETED | OUTPATIENT
Start: 2021-05-20

## 2021-05-20 RX ADMIN — DENOSUMAB 0 MG/ML: 60 INJECTION SUBCUTANEOUS at 00:00

## 2021-05-27 ENCOUNTER — OUTPATIENT (OUTPATIENT)
Dept: OUTPATIENT SERVICES | Facility: HOSPITAL | Age: 71
LOS: 1 days | End: 2021-05-27
Payer: MEDICARE

## 2021-05-27 ENCOUNTER — APPOINTMENT (OUTPATIENT)
Dept: CT IMAGING | Facility: CLINIC | Age: 71
End: 2021-05-27
Payer: MEDICARE

## 2021-05-27 DIAGNOSIS — Z00.8 ENCOUNTER FOR OTHER GENERAL EXAMINATION: ICD-10-CM

## 2021-05-27 DIAGNOSIS — H26.9 UNSPECIFIED CATARACT: Chronic | ICD-10-CM

## 2021-05-27 PROCEDURE — 71250 CT THORAX DX C-: CPT

## 2021-05-27 PROCEDURE — 71250 CT THORAX DX C-: CPT | Mod: 26,MH

## 2021-08-26 ENCOUNTER — APPOINTMENT (OUTPATIENT)
Dept: ULTRASOUND IMAGING | Facility: CLINIC | Age: 71
End: 2021-08-26
Payer: MEDICARE

## 2021-08-26 ENCOUNTER — APPOINTMENT (OUTPATIENT)
Dept: MAMMOGRAPHY | Facility: CLINIC | Age: 71
End: 2021-08-26
Payer: MEDICARE

## 2021-08-26 PROCEDURE — G0279: CPT

## 2021-08-26 PROCEDURE — 76641 ULTRASOUND BREAST COMPLETE: CPT | Mod: 50

## 2021-08-26 PROCEDURE — 77066 DX MAMMO INCL CAD BI: CPT

## 2021-11-30 ENCOUNTER — APPOINTMENT (OUTPATIENT)
Dept: RHEUMATOLOGY | Facility: CLINIC | Age: 71
End: 2021-11-30
Payer: MEDICARE

## 2021-11-30 VITALS
OXYGEN SATURATION: 97 % | TEMPERATURE: 96.9 F | DIASTOLIC BLOOD PRESSURE: 68 MMHG | RESPIRATION RATE: 16 BRPM | SYSTOLIC BLOOD PRESSURE: 114 MMHG | HEIGHT: 67.5 IN | WEIGHT: 120 LBS | BODY MASS INDEX: 18.61 KG/M2 | HEART RATE: 68 BPM

## 2021-11-30 PROCEDURE — 96372 THER/PROPH/DIAG INJ SC/IM: CPT

## 2021-12-02 ENCOUNTER — MED ADMIN CHARGE (OUTPATIENT)
Age: 71
End: 2021-12-02

## 2021-12-03 RX ORDER — DENOSUMAB 60 MG/ML
60 INJECTION SUBCUTANEOUS
Qty: 1 | Refills: 0 | Status: COMPLETED | OUTPATIENT
Start: 2021-12-03

## 2021-12-03 RX ADMIN — DENOSUMAB 0 MG/ML: 60 INJECTION SUBCUTANEOUS at 00:00

## 2022-01-12 NOTE — PATIENT PROFILE ADULT. - PRO INTERPRETER NEED 2
CARE TRANSITIONS (HRTIC)    Attempted to contact patient regarding enrollment in Care Transitions program post discharge. Left voicemail. Will attempt at a later date/time.    
English

## 2022-04-03 ENCOUNTER — NON-APPOINTMENT (OUTPATIENT)
Age: 72
End: 2022-04-03

## 2022-04-05 ENCOUNTER — APPOINTMENT (OUTPATIENT)
Dept: GASTROENTEROLOGY | Facility: CLINIC | Age: 72
End: 2022-04-05
Payer: MEDICARE

## 2022-04-05 VITALS
SYSTOLIC BLOOD PRESSURE: 190 MMHG | DIASTOLIC BLOOD PRESSURE: 60 MMHG | HEART RATE: 66 BPM | OXYGEN SATURATION: 96 % | BODY MASS INDEX: 17.13 KG/M2 | WEIGHT: 113 LBS | TEMPERATURE: 98.8 F | HEIGHT: 68 IN

## 2022-04-05 DIAGNOSIS — R19.8 OTHER SPECIFIED SYMPTOMS AND SIGNS INVOLVING THE DIGESTIVE SYSTEM AND ABDOMEN: ICD-10-CM

## 2022-04-05 PROCEDURE — 99204 OFFICE O/P NEW MOD 45 MIN: CPT

## 2022-04-05 NOTE — HISTORY OF PRESENT ILLNESS
[de-identified] : 71F with pmhx of bronchiectasis, osteoporosis, here today to discuss change in bowel movement. States that over the last 6 mos or so, has had difficulty with initiating a bowel movement and passing stool. States she has had to self disimpact regularly. Stools are normal to slightly hard. No blood noted. Denies abd pain, n/v, fevers, chills. Weight fluctates between 115 and 120lbs. \par \par

## 2022-04-05 NOTE — ASSESSMENT
[FreeTextEntry1] : 71F with pmhx of bronchiectasis, osteoporosis, here today to discuss change in bowel movement. Over the last 6 mos has had difficulty with initiating a bowel movement and passing stool. States she has had to self disimpact regularly. Stools are normal to slightly hard. No blood noted. Denies abd pain, n/v, fevers, chills. Weight fluctuates between 115 and 120lbs. Last colonoscopy ~10yrs ago and normal. No fhx of Colon Cancer. \par \par - Schedule colonoscopy for intraluminal evaluation. Risks/benefits reviewed. \par - Prep sent to pharmacy on file\par - If negative colonoscopy, refer for anorectal manometry \par - start fiber supplement or miralax \par

## 2022-04-12 ENCOUNTER — APPOINTMENT (OUTPATIENT)
Dept: CT IMAGING | Facility: CLINIC | Age: 72
End: 2022-04-12
Payer: MEDICARE

## 2022-04-12 ENCOUNTER — OUTPATIENT (OUTPATIENT)
Dept: OUTPATIENT SERVICES | Facility: HOSPITAL | Age: 72
LOS: 1 days | End: 2022-04-12
Payer: MEDICARE

## 2022-04-12 DIAGNOSIS — Z00.8 ENCOUNTER FOR OTHER GENERAL EXAMINATION: ICD-10-CM

## 2022-04-12 DIAGNOSIS — H26.9 UNSPECIFIED CATARACT: Chronic | ICD-10-CM

## 2022-04-12 PROCEDURE — 71250 CT THORAX DX C-: CPT | Mod: 26,MH

## 2022-04-12 PROCEDURE — 71250 CT THORAX DX C-: CPT | Mod: MH

## 2022-05-06 ENCOUNTER — APPOINTMENT (OUTPATIENT)
Dept: GASTROENTEROLOGY | Facility: HOSPITAL | Age: 72
End: 2022-05-06

## 2022-06-03 DIAGNOSIS — Z12.11 ENCOUNTER FOR SCREENING FOR MALIGNANT NEOPLASM OF COLON: ICD-10-CM

## 2022-06-05 ENCOUNTER — NON-APPOINTMENT (OUTPATIENT)
Age: 72
End: 2022-06-05

## 2022-06-09 ENCOUNTER — APPOINTMENT (OUTPATIENT)
Dept: RHEUMATOLOGY | Facility: CLINIC | Age: 72
End: 2022-06-09
Payer: MEDICARE

## 2022-06-09 VITALS
HEART RATE: 101 BPM | BODY MASS INDEX: 16.82 KG/M2 | HEIGHT: 68 IN | TEMPERATURE: 98.2 F | OXYGEN SATURATION: 95 % | SYSTOLIC BLOOD PRESSURE: 118 MMHG | WEIGHT: 111 LBS | DIASTOLIC BLOOD PRESSURE: 77 MMHG

## 2022-06-09 PROCEDURE — 96372 THER/PROPH/DIAG INJ SC/IM: CPT

## 2022-06-09 PROCEDURE — 99213 OFFICE O/P EST LOW 20 MIN: CPT | Mod: 25

## 2022-06-09 RX ORDER — DENOSUMAB 60 MG/ML
60 INJECTION SUBCUTANEOUS
Qty: 1 | Refills: 0 | Status: COMPLETED | OUTPATIENT
Start: 2022-06-09

## 2022-06-09 RX ADMIN — DENOSUMAB 0 MG/ML: 60 INJECTION SUBCUTANEOUS at 00:00

## 2022-06-29 ENCOUNTER — APPOINTMENT (OUTPATIENT)
Dept: CT IMAGING | Facility: CLINIC | Age: 72
End: 2022-06-29

## 2022-06-29 ENCOUNTER — OUTPATIENT (OUTPATIENT)
Dept: OUTPATIENT SERVICES | Facility: HOSPITAL | Age: 72
LOS: 1 days | End: 2022-06-29
Payer: MEDICARE

## 2022-06-29 DIAGNOSIS — Z00.8 ENCOUNTER FOR OTHER GENERAL EXAMINATION: ICD-10-CM

## 2022-06-29 DIAGNOSIS — H26.9 UNSPECIFIED CATARACT: Chronic | ICD-10-CM

## 2022-06-29 PROCEDURE — 71250 CT THORAX DX C-: CPT | Mod: MH

## 2022-06-29 PROCEDURE — 71250 CT THORAX DX C-: CPT | Mod: 26,MH

## 2022-07-08 ENCOUNTER — APPOINTMENT (OUTPATIENT)
Dept: ENDOCRINOLOGY | Facility: CLINIC | Age: 72
End: 2022-07-08

## 2022-07-25 DIAGNOSIS — Z01.812 ENCOUNTER FOR PREPROCEDURAL LABORATORY EXAMINATION: ICD-10-CM

## 2022-07-25 DIAGNOSIS — Z00.00 ENCOUNTER FOR GENERAL ADULT MEDICAL EXAMINATION W/OUT ABNORMAL FINDINGS: ICD-10-CM

## 2022-07-25 DIAGNOSIS — Z12.11 ENCOUNTER FOR SCREENING FOR MALIGNANT NEOPLASM OF COLON: ICD-10-CM

## 2022-08-02 ENCOUNTER — NON-APPOINTMENT (OUTPATIENT)
Age: 72
End: 2022-08-02

## 2022-08-02 ENCOUNTER — APPOINTMENT (OUTPATIENT)
Dept: GASTROENTEROLOGY | Facility: HOSPITAL | Age: 72
End: 2022-08-02

## 2022-08-02 ENCOUNTER — OUTPATIENT (OUTPATIENT)
Dept: OUTPATIENT SERVICES | Facility: HOSPITAL | Age: 72
LOS: 1 days | Discharge: HOME | End: 2022-08-02

## 2022-08-02 VITALS
DIASTOLIC BLOOD PRESSURE: 66 MMHG | RESPIRATION RATE: 12 BRPM | SYSTOLIC BLOOD PRESSURE: 121 MMHG | TEMPERATURE: 98 F | OXYGEN SATURATION: 96 % | HEART RATE: 75 BPM

## 2022-08-02 VITALS
SYSTOLIC BLOOD PRESSURE: 99 MMHG | RESPIRATION RATE: 16 BRPM | HEART RATE: 73 BPM | DIASTOLIC BLOOD PRESSURE: 53 MMHG | OXYGEN SATURATION: 96 %

## 2022-08-02 DIAGNOSIS — Z12.11 ENCOUNTER FOR SCREENING FOR MALIGNANT NEOPLASM OF COLON: ICD-10-CM

## 2022-08-02 DIAGNOSIS — H26.9 UNSPECIFIED CATARACT: Chronic | ICD-10-CM

## 2022-08-02 PROCEDURE — 45378 DIAGNOSTIC COLONOSCOPY: CPT

## 2022-08-02 NOTE — ASU PATIENT PROFILE, ADULT - NSICDXPASTSURGICALHX_GEN_ALL_CORE_FT
Addended by: Pramod Pryor on: 4/5/2021 03:42 PM     Modules accepted: Orders
PAST SURGICAL HISTORY:  Acquired cataract

## 2022-08-02 NOTE — ASU PATIENT PROFILE, ADULT - ALCOHOL USE HISTORY SINGLE SELECT
Hpi Title: Evaluation of Skin Lesions How Severe Are Your Spot(S)?: mild Family Member: Brother never

## 2022-08-02 NOTE — ASU PATIENT PROFILE, ADULT - FALL HARM RISK - UNIVERSAL INTERVENTIONS
Bed in lowest position, wheels locked, appropriate side rails in place/Call bell, personal items and telephone in reach/Instruct patient to call for assistance before getting out of bed or chair/Non-slip footwear when patient is out of bed/[place] to call system/Physically safe environment - no spills, clutter or unnecessary equipment/Purposeful Proactive Rounding/Room/bathroom lighting operational, light cord in reach

## 2022-08-05 ENCOUNTER — NON-APPOINTMENT (OUTPATIENT)
Age: 72
End: 2022-08-05

## 2022-08-08 ENCOUNTER — APPOINTMENT (OUTPATIENT)
Dept: ENDOCRINOLOGY | Facility: CLINIC | Age: 72
End: 2022-08-08

## 2022-08-08 VITALS — WEIGHT: 111 LBS | HEIGHT: 67.8 IN | TEMPERATURE: 97 F | BODY MASS INDEX: 17.02 KG/M2

## 2022-08-08 PROCEDURE — 77080 DXA BONE DENSITY AXIAL: CPT

## 2022-08-16 NOTE — PROGRESS NOTE BEHAVIORAL HEALTH - NS ED [facility] MSE SPEECH VOLUME
"PSYCHIATRY  PROGRESS NOTE     DATE OF SERVICE   08/16/2022       CHIEF COMPLAINT   Patient was admitted due to inability to safely care for himself and psychosis.       SUBJECTIVE   Nursing reports:   Patient remains isolative and withdrawn. He spent his time in his room throughout this shift. He only ate about 50% of his breakfast. He refused to attend groups and declined to have a shower. He is unkempt. He is med compliant. He denied SI/SIB nor hallucinations. Denied wishing himself to be dead.     Patient has been discussed in detail with the  during team meeting.  We continue with no updates about the guardianship process.     OBJECTIVE   Patient was seen and evaluated at bedside by himself, this was a face-to-face evaluation.  Patient remains at his baseline and no new behaviors have been reported.  Patient stated that he is doing \"all right\".  Remains delusional and hallucinating.  Denies suicidal ideations and is able to contract for safety.       MEDICATIONS   Medications:  Scheduled Meds:    aspirin  81 mg Oral Daily     benztropine  0.5 mg Oral BID     busPIRone HCl  30 mg Oral BID     cloZAPine  250 mg Oral At Bedtime     donepezil  10 mg Oral At Bedtime     gabapentin  100 mg Oral TID w/meals     levothyroxine  88 mcg Oral Daily     megestrol  20 mg Oral Daily     melatonin  5 mg Oral At Bedtime     memantine  10 mg Oral BID     mirtazapine  15 mg Oral At Bedtime     salmeterol  1 puff Inhalation BID     Continuous Infusions:  PRN Meds:.albuterol, alum & mag hydroxide-simethicone, benzocaine-menthol, hydrOXYzine, nicotine, nystatin, polyethylene glycol, polyethylene glycol-propylene glycol PF, senna-docusate    Medication adherence issues: MS Med Adherence Y/N: Yes, Hospitalization  Medication side effects: MEDICATION SIDE EFFECTS: no side effects reported  Benefit: Yes / No: Yes       ROS   A comprehensive review of systems was negative.       MENTAL STATUS EXAM   Vitals: BP 92/69   Pulse "
"92   Temp 97.2  F (36.2  C) (Temporal)   Resp 16   Ht 1.778 m (5' 10\")   Wt 77.3 kg (170 lb 6.7 oz)   SpO2 94%   BMI 24.45 kg/m         Appearance:  No apparent distress  Mood: \"Feeling all right\"  Affect: Calm  Suicidal Ideation: Denies  Homicidal Ideation: Denies  Thought process: Disorganized and concrete  Thought content: Remains confused and delusional and hallucinating at times.    Fund of Knowledge: Below average  Attention/Concentration: Poor  Language ability: Significantly impaired  Memory: Global memory impairment  Insight:  Poor.  Judgement: Poor  Orientation: Only to person  Psychomotor Behavior: slowed    Muscle Strength and Tone: MuscleStrength: Normal and Atrophy  Gait and Station: Not evaluated       LABS   personally reviewed.     No results found for: PHENYTOIN, PHENOBARB, VALPROATE, CBMZ       DIAGNOSIS   Principal Problem:    Major neurocognitive disorder, due to multiple etiologies, with behavioral disturbance, severe (H)    Active Problem List:  Patient Active Problem List   Diagnosis     TBI with aggressive behavior     HTN (hypertension)     COPD (chronic obstructive pulmonary disease) (H)     Dementia with behavioral disturbance (H)     Chronic schizophrenia (H)     Smoker     Agitation     Behavior disturbance     Psychosis (H)     Major neurocognitive disorder, due to multiple etiologies, with behavioral disturbance, severe (H)     Paranoid schizophrenia, chronic condition with acute exacerbation (H)     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 Ongoing education given regarding diagnostic and treatment options with risks, benefits and alternatives and adequate verbalization of understanding.  2.  Medications       BuSpar 30 mg 2 times daily       Cogentin 0.5 mg 2 times a day       Clozaril 250 mg at bedtime    "
   Aricept 10 mg at bedtime       Gabapentin 100 mg 3 times a day       Namenda 10 mg 2 times a day       Remeron 15 mg at bedtime       melatonin 5 mg at bedtime  3.  Medical team following the patient   4.   coordinating a safe discharge plan with the patient.  5.  Labs have been ordered, liver functions remained stable consistent with the hep C diagnosis.      Risk Assessment: [facility] RISK ASSESSMENT: Patient able to contract for safety    Coordination of Care:   Treatment Plan reviewed and physician signed, Care discussed with Care/Treatment Team Members, Chart reviewed and Patient seen      Re-Certification I certify that the inpatient psychiatric facility services furnished since the previous certification were, and continue to be, medically necessary for, either, treatment which could reasonably be expected to improve the patient s condition or diagnostic study and that the hospital records indicate that the services furnished were, either, intensive treatment services, admission and related services necessary for diagnostic study, or equivalent services.     I certify that the patient continues to need, on a daily basis, active treatment furnished directly by or requiring the supervision of inpatient psychiatric facility personnel.   I estimate 14 days of hospitalization is necessary for proper treatment of the patient. My plans for post-hospital care for this patient are  TBD     Ginger Dubon MD    -     08/16/2022  -     3:53 PM    Total time 15 minutes with > 50%spent on coordination of cares and psycho-education.    This note was created with help of Dragon dictation system. Grammatical / typing errors are not intentional.    Ginger Dubon MD     
Normal
Normal
Soft

## 2022-08-22 ENCOUNTER — APPOINTMENT (OUTPATIENT)
Dept: INFECTIOUS DISEASE | Facility: CLINIC | Age: 72
End: 2022-08-22

## 2022-08-22 VITALS
HEART RATE: 90 BPM | SYSTOLIC BLOOD PRESSURE: 119 MMHG | DIASTOLIC BLOOD PRESSURE: 79 MMHG | WEIGHT: 108 LBS | OXYGEN SATURATION: 96 % | BODY MASS INDEX: 16.56 KG/M2 | HEIGHT: 67.8 IN | TEMPERATURE: 98.6 F

## 2022-08-22 DIAGNOSIS — Z83.6 FAMILY HISTORY OF OTHER DISEASES OF THE RESPIRATORY SYSTEM: ICD-10-CM

## 2022-08-22 PROCEDURE — 99203 OFFICE O/P NEW LOW 30 MIN: CPT

## 2022-08-29 ENCOUNTER — APPOINTMENT (OUTPATIENT)
Dept: MAMMOGRAPHY | Facility: CLINIC | Age: 72
End: 2022-08-29

## 2022-08-29 ENCOUNTER — APPOINTMENT (OUTPATIENT)
Dept: ULTRASOUND IMAGING | Facility: CLINIC | Age: 72
End: 2022-08-29

## 2022-08-29 PROCEDURE — 77067 SCR MAMMO BI INCL CAD: CPT

## 2022-08-29 PROCEDURE — 77063 BREAST TOMOSYNTHESIS BI: CPT

## 2022-08-29 PROCEDURE — 76641 ULTRASOUND BREAST COMPLETE: CPT | Mod: 50

## 2022-08-31 NOTE — ASSESSMENT
[FreeTextEntry1] : 71 F dx with bronchiectasis few years ago when she developed a cough.  Presents today for DENNY quinn/kel. \par \par She is reporting cough and that it sometimes limits her activities and what she does.\par \par She does meet criteria for treatment with 2 positive cultures and abnormal imaging. I have given her literature to review and take home today.  She is not opposed to treatment if needed for NTM but hesitant due to potential side effects. \par \par I will start by repeating her sputum culture for bacteria and AFB.  I could try to treat any more routine bacteria first to see if helps her cough.\par \par I explained that treatment would require likely 3 medications and we reviewed the side effects. There are also ongoing research studies that we could discuss as well.\par \par For now will start by repeating culture. Asked pt to f/up in 3 months. \par She will continue routine monitoring with her pulmonologist as well.

## 2022-08-31 NOTE — CONSULT LETTER
[Dear  ___] : Dear  [unfilled], [Consult Letter:] : I had the pleasure of evaluating your patient, [unfilled]. [Please see my note below.] : Please see my note below. [Consult Closing:] : Thank you very much for allowing me to participate in the care of this patient.  If you have any questions, please do not hesitate to contact me. [Sincerely,] : Sincerely, [FreeTextEntry2] : Dr. Zhao Leo [FreeTextEntry3] : \par No Hernandez MD\par  of Medicine\par Division of Infectious Diseases\par [facility] [employer] [facility] at [facility]\par [address] [place] [address] [address]u\[place] [address]\par Tel: [phone]\par Fax: [phone]

## 2022-08-31 NOTE — PHYSICAL EXAM
[General Appearance - Alert] : alert [General Appearance - In No Acute Distress] : in no acute distress [General Appearance - Well-Appearing] : healthy appearing [Sclera] : the sclera and conjunctiva were normal [PERRL With Normal Accommodation] : pupils were equal in size, round, reactive to light [Extraocular Movements] : extraocular movements were intact [Outer Ear] : the ears and nose were normal in appearance [Oropharynx] : the oropharynx was normal with no thrush [Neck Appearance] : the appearance of the neck was normal [Neck Cervical Mass (___cm)] : no neck mass was observed [Jugular Venous Distention Increased] : there was no jugular-venous distention [Thyroid Diffuse Enlargement] : the thyroid was not enlarged [] : no respiratory distress [Auscultation Breath Sounds / Voice Sounds] : lungs were clear to auscultation bilaterally [Heart Rate And Rhythm] : heart rate was normal and rhythm regular [Heart Sounds] : normal S1 and S2 [Heart Sounds Gallop] : no gallops [Murmurs] : no murmurs [Heart Sounds Pericardial Friction Rub] : no pericardial rub [Edema] : there was no peripheral edema [Bowel Sounds] : normal bowel sounds [Abdomen Soft] : soft [Cervical Lymph Nodes Enlarged Posterior Bilaterally] : posterior cervical [Cervical Lymph Nodes Enlarged Anterior Bilaterally] : anterior cervical [Supraclavicular Lymph Nodes Enlarged Bilaterally] : supraclavicular [Musculoskeletal - Swelling] : no joint swelling [Nail Clubbing] : no clubbing  or cyanosis of the fingernails [Motor Tone] : muscle strength and tone were normal [Skin Lesions] : no skin lesions [No Focal Deficits] : no focal deficits [Oriented To Time, Place, And Person] : oriented to person, place, and time [Affect] : the affect was normal

## 2022-08-31 NOTE — HISTORY OF PRESENT ILLNESS
[FreeTextEntry1] : 71 F dx with bronchiectasis few years ago when she developed a cough.  Presents today for M chimear/intracellulare. \par Developed dry cough at that time.\par \par ROS: Brings up phlegm now\par Can spend long stretches coughing\par Likes to avoid social gatherings at times b/c of cough\par Sometimes gets some partial relief with some of her inhalers.\par Can help temporarily and then comes back few hours later. \par Can pull her back b/c of cough.\par \par No cough at sleep. \par No fevers/chills\par Some weight loss but tends to lose weight in the summer and adds weight back in the winter.\par \par She reports she hasn't had much antibiotics. \par \par CT chest: 7/2022.Ground glass nodule in the left apex, 1.5 x 1.3 cm, overall stable compared to 2021.  Possibly increased compared to more remote studies. Persistent multifocal bronchiectasis in both lungs with mucoid impaction. A new ground glass density in the lateral RML tubular nodular denisty in the LLL was noted although other nodular densities seem to be smaller Most notably a nodule in the right apex was smaller on the current study.  \par \par Sputum from 6/20 had 2 cultures positive for M. chimera/intracellularie. 4/2022 was negative on AFB.

## 2022-09-06 DIAGNOSIS — R05.9 COUGH, UNSPECIFIED: ICD-10-CM

## 2022-09-06 LAB — BACTERIA SPT CULT: ABNORMAL

## 2022-09-13 NOTE — ASU PREOP CHECKLIST - PATIENT PROBLEMS/NEEDS
Diagnoses and all orders for this visit:    Muscle spasm  -     PAIN INJECTION EVAL/TREAT/FOLLOW UP        Trigger points were identified by patient, and marked when appropriate.  The area was prepped with Chloroprep.    Using clean technique, injections were completed using a 25G, 3.5 inch needle.  After negative aspiration, injection was completed.  A total of 5 locations were injected.  When possible, tissue was retracted from the chest wall to avoid lung injury.    Muscle groups injected:  Bilateral quadratus lumborum  latissimus dorsi, paraspinal muscles     Injection solution contained:  9ml of 0.5% bupivacaine and 40mg of Kenalog        Bubba Montgomery MD  [facility]     Patient expressed no known problems or needs

## 2022-10-07 ENCOUNTER — APPOINTMENT (OUTPATIENT)
Dept: INFECTIOUS DISEASE | Facility: CLINIC | Age: 72
End: 2022-10-07

## 2022-10-07 VITALS
HEART RATE: 105 BPM | OXYGEN SATURATION: 96 % | SYSTOLIC BLOOD PRESSURE: 124 MMHG | DIASTOLIC BLOOD PRESSURE: 74 MMHG | TEMPERATURE: 98 F | BODY MASS INDEX: 16.95 KG/M2 | WEIGHT: 108 LBS | HEIGHT: 67 IN | RESPIRATION RATE: 16 BRPM

## 2022-10-07 PROCEDURE — 99214 OFFICE O/P EST MOD 30 MIN: CPT

## 2022-10-11 ENCOUNTER — APPOINTMENT (OUTPATIENT)
Dept: PULMONOLOGY | Facility: CLINIC | Age: 72
End: 2022-10-11

## 2022-10-13 LAB — ACID FAST STN SPT: ABNORMAL

## 2022-10-13 NOTE — CONSULT LETTER
[Dear  ___] : Dear  [unfilled], [Consult Letter:] : I had the pleasure of evaluating your patient, [unfilled]. [Please see my note below.] : Please see my note below. [Consult Closing:] : Thank you very much for allowing me to participate in the care of this patient.  If you have any questions, please do not hesitate to contact me. [Sincerely,] : Sincerely, [FreeTextEntry2] : Dr. Zhao Leo [FreeTextEntry3] : \par No Hernandez MD\par  of Medicine\par Division of Infectious Diseases\par [facility] [employer] [facility] [employer] [facility] at [facility]\par [address] [place] [address] [address]\[place] [address]\par Tel: [phone]\par Fax: [phone]

## 2022-10-13 NOTE — HISTORY OF PRESENT ILLNESS
[FreeTextEntry1] : 72 F dx with bronchiectasis few years ago when she developed a cough.  Presents today for M chimeara/intracellulare positive sputum culture.\par \par Per first visit on 8/22/2022 \par Developed dry cough at that time.\par \par ROS: Brings up phlegm now\par Can spend long stretches coughing\par Likes to avoid social gatherings at times b/c of cough\par Sometimes gets some partial relief with some of her inhalers.\par Can help temporarily and then comes back few hours later. \par Can pull her back b/c of cough.\par \par No cough at sleep. \par No fevers/chills\par Some weight loss but tends to lose weight in the summer and adds weight back in the winter.\par \par She reports she hasn't had much antibiotics. \par \par CT chest: 7/2022.Ground glass nodule in the left apex, 1.5 x 1.3 cm, overall stable compared to 2021.  Possibly increased compared to more remote studies. Persistent multifocal bronchiectasis in both lungs with mucoid impaction. A new ground glass density in the lateral RML tubular nodular denisty in the LLL was noted although other nodular densities seem to be smaller Most notably a nodule in the right apex was smaller on the current study.  \par \par Sputum from 6/20 had 2 cultures positive for M. chimera/intracellularie. 4/2022 was negative on AFB. \par \par 10/7/22:\par Sputum from last visit grew e coli. Treated with omnicef 300 mg po bid x 10 days with some improvement but cough returned.  AFB culture again grew M. chimera/intracellulare.  She started new inhaler though which is helping her and cough is better. She is hesitant to commit to antibiotics for NTM infection.  D/w pulmonary and has planned f/up ct in near future. \par \par

## 2022-10-13 NOTE — ASSESSMENT
[FreeTextEntry1] : 72 F dx with bronchiectasis few years ago when she developed a cough.  Presents today for M chimear/intracellulare infection. \par \par She is reporting cough and that it sometimes limits her activities and what she does.  This has gotten better since last visit.  \par \par She does meet criteria for treatment with 2 positive cultures and abnormal imaging. I have given her literature to review and take home today.  She is not opposed to treatment if needed for NTM but hesitant due to potential side effects. \par \par She would like to observe how she does for now and try to avoid the long term antibiotics.  \par \par She started using the acapella more and feels this is helping also.  \par \par I explained that treatment would require likely 2-3 medications and we reviewed the side effects. There are also ongoing research studies that we could discuss as well.\par \par She is planned for a f/up ct chest in the near future and once this is done her pulmonologist will forward me the results.\par \par Rec:\par 1) Continue mucus clearing devices\par 2) Monitor her symptoms for now\par 3) F/up Repeat CT chest\par 4) If any worsening cough - can repeat sputum and try to treat with short course antibiotics\par 5) Respiratory Inhalers as per pulmonary\par 6) Asked pt to return in 4-6 months, but she can return sooner if needed\par 7) Avoid losing more weight\par 8) F/up sensitivity on her isolate - still pending\par \par

## 2022-10-21 ENCOUNTER — APPOINTMENT (OUTPATIENT)
Dept: INFECTIOUS DISEASE | Facility: CLINIC | Age: 72
End: 2022-10-21

## 2022-11-11 ENCOUNTER — NON-APPOINTMENT (OUTPATIENT)
Age: 72
End: 2022-11-11

## 2022-11-16 ENCOUNTER — APPOINTMENT (OUTPATIENT)
Dept: PULMONOLOGY | Facility: CLINIC | Age: 72
End: 2022-11-16

## 2022-11-16 VITALS
DIASTOLIC BLOOD PRESSURE: 65 MMHG | WEIGHT: 110 LBS | RESPIRATION RATE: 15 BRPM | OXYGEN SATURATION: 90 % | BODY MASS INDEX: 17.27 KG/M2 | HEART RATE: 80 BPM | SYSTOLIC BLOOD PRESSURE: 123 MMHG | TEMPERATURE: 98 F | HEIGHT: 67 IN

## 2022-11-16 DIAGNOSIS — A31.0 PULMONARY MYCOBACTERIAL INFECTION: ICD-10-CM

## 2022-11-16 DIAGNOSIS — J47.9 BRONCHIECTASIS, UNCOMPLICATED: ICD-10-CM

## 2022-11-16 PROCEDURE — 99203 OFFICE O/P NEW LOW 30 MIN: CPT

## 2022-11-16 NOTE — PHYSICAL EXAM
[No Acute Distress] : no acute distress [Normal Appearance] : normal appearance [Normal Rate/Rhythm] : normal rate/rhythm [Normal S1, S2] : normal s1, s2 [No Resp Distress] : no resp distress [Normal Gait] : normal gait [No Clubbing] : no clubbing [Oriented x3] : oriented x3 [TextBox_68] : Scattered crackles

## 2022-11-16 NOTE — ASSESSMENT
[FreeTextEntry1] : . The reduction in lung function over the last 20 years point towards  the progressiveness of her bronchiectasis.\par There is no apparent etiology but currently she has active infection with Mycobacterium avium. We discussed treatment in detail which should include nebulized albuterol, nebulized hypertonic saline, oscillating vest twice a day coupled with triple therapy for her mycobacterial infection.. Prior attempts to mobilize secretions with manual chest physical therapy and acapella device have been unsuccessful as evidenced by progressive loss of lung function.  I will discuss this in detail with Dr. Hernandez.\par \par \par PFT data from [facility]\par \par                        FVC                       FEV1\par \par 2003            3.64 (96%)              2.57 (90%)\par 2014            2.95 (86%)              1.66 (89%)\par 2017            2.21 (62%)              1.35(50%)\par 2018            1.83(52%)               0.99 (37%)

## 2022-11-16 NOTE — HISTORY OF PRESENT ILLNESS
[TextBox_4] : 72-year-old female with progressive bronchiectasis and obstructive lung disease with active Mycobacterium avium infection. The patient has recently completed antibiotic treatment for Escherichia coli and sputum culture. The source of the bronchiectasis is not known. She believes her mother might have bronchiectasis but there is no other family history. She is known about her disease, a productive and chronic and productive cough, since at least 2003 when her lung functions were reasonably normal. At that time her forced vital capacity was 3.64 L (96% and her FEV1 was 2.57 L (90% she has been seen by at least 3 pulmonary physicians in the past. I was able to find lung functions from 2014 and her forced vital capacity was 2.95 L and her FEV1 1.66 L, 2017 FVC 2.21 L and FEV1 1.35 L and in 2018 he forced vital capacity of 1.83 L (52% and an FEV1 of 0.99 (37%. She does admit to dyspnea occasionally when climbing a flight of stairs but her main symptom is a continuous cough that is intermittently productive.\par . Her weight has been relatively stable for the last 10 years

## 2022-11-29 NOTE — H&P ADULT - PROBLEM SELECTOR PROBLEM 4
[facility] - Outpatient Rehabilitation and Therapy, [place] [address] [address]  Phone: [phone]   Fax:     [phone]      Physical Therapy Treatment Note/ Progress Report:     Date:  2022    Patient Name:  Edwin Pizano    :  1967  MRN: [id number]      Medical/Treatment Diagnosis Information:  Diagnosis: M79.606 Pain of LE, unspecified laterality  Treatment Diagnosis: R LE Pain Q23.6    Insurance/Certification information:  PT Insurance Information: [employer] Employee, 10% coinsurance, allowed 30 visits per calendar year, does not require prior authorization, has used 2 visits to date  Physician Information:  ELENA Ceballos  Plan of care signed (Y/N): vignesh requested    Date of Patient follow up with Physician:      Progress Report: []  Yes  [x]  No     Date Range for reporting period:  Beginnin22  Ending:      Progress report due (10 Rx/or 30 days whichever is less):     Recertification due (POC duration/ or 90 days whichever is less):      Visit # POC/Insurance Allowable Auth Needed   6 12/ []Yes   [x]No     Latex Allergy:  [x]NO      []YES  Preferred Language for Healthcare:   [x]English       []other:    History of Injury: Patient reports she went to [facility] in October, walked 6 miles, next day had terrible pain down R LE. It eased up after stretching it and babying it. Last weekend, went to [place], walked a lot again, and it flared again. \"I barely was dragging my leg by the end of the day. \"  Patient normally walks about 30 minutes outdoors, do strength training at home, stretching. Sometimes it is painful upon waking in am, sometimes painful after car ride, usually flares up after being on feet a while, then sits down, when gets back up it is sore and tight. \"If it's flared, I don't walk very far. But there's no prediction of what will aggravate it. \"  Alpa Lanza can be okay, it hurts when it's flared. Sleeping is okay. Relevant Medical History:HBP, Osteoarthritis, R piriformis difficulty for several years  Functional Scale/Score: FOTO = 61 (11/1/22)     Pain Scale: 7/10 when in flare, currently 1/10    SUBJECTIVE:  See eval  11/8/22:  Patient reports she is feeling a little better. 11/11: Did very well after last session. Went to bed feeling great. Woke up with SI pain and pain in post/lat knee (sleep funny? Weather?)  11/14: A little sore. Did some painting over the weekend. Has not had any of the lateral thigh pain  11/22/22:  Patient is doing better, has some soreness in buttock but thigh and knee have been better. Patient has started to do a little yoga on lunch break, which feels good. 11/29: Doing pretty good. Leg does not feel shaky or unsteady when she plants her foot anymore. Had to work yesterday and sitting so much causes her to tighten up. Tries to get and move around a lot.  (Reports tightness in glute medius)    OBJECTIVE:  Observation: min/mod tightness R piriformis, superior gluteals  Test measurements:       RESTRICTIONS/PRECAUTIONS:     Exercises/Interventions:     Therapeutic Ex (46913)   Min: 10 Reps/Resistance Notes/CUES   NuStep Level 1 x 6 minutes Seat #5, UEs #6   Calf Stretch/Incline 3 x 30 sec  Incline board             NMR re-education (80277)  Min: 82 Yessi Rider Ex Program  See below   Leg Extension 11 pounds, 3 x 10 reps L/R Seat #3, Tibia #2   Leg Curl 11 pounds, 3 x 10 reps L/R Seat #3, Tibia #2   Tband  Rows              Lats              Ext Yellow x10  Yellow x10  Yellow x10         Therapeutic Activity (74270)  Min:     Patient education  See below                  Manual Intervention ([id number])  Min:20     Soft tissue mob/IASTM R piriformis, IT band, glut mod aggressive L sidelying   IASTM     Patellar Mob     PROM     Modalities  Min:     IFC with      CP after exercises     MH after exercises            Other Therapeutic Activities: Pt was educated on PT POC, Diagnosis, Prognosis, pathomechanics as well as frequency and duration of scheduling future physical therapy appointments. Time was also taken on this day to answer all patient questions and participation in PT. Reviewed appointment policy in detail with patient and patient verbalized understanding. Discussed at length anatomy and biomechanics of the hip and knee/IT band and pirifomis. muscle reeducation, motor recruitment. Home Exercise Program:Patient instructed in chin tucks, lower trap sets, TA sets,glut sets, quad sets, isometric hip add, piriformis/IT band stretches in sitting, hooklying, supine ; written instructions with pictures issued, patient able to demonstrate exercises  11/9/22:   Patient instructed in bent like fall out, sidelying clamshell with pillow ; written instructions with pictures issued, patient able to demonstrate exercises. 11/11: seated hamstring stretch and hip IR/ER  11/22/22:   Patient instructed in prone and standing hip ext ; written instructions with pictures issued, patient able to demonstrate exercises. 11/29: Tband row, lats, and extensions with orange band    ASSESSMENT:  Patient presents with a history of  R LE pain due to muscle imbalance of core/LEs/piriformis and IT band syndrome    . Patient would benefit from PT to increase functional mobility. GOALS:  Patient stated goal: \"Exercise\"  [] Progressing: [] Met: [] Not Met: [] Adjusted     Therapist goals for Patient:   Short Term Goals: To be achieved in: 2 weeks  1. Independent in HEP and progression per patient tolerance, in order to prevent re-injury. [] Progressing: [] Met: [] Not Met: [] Adjusted  2. Patient will have a decrease in pain to facilitate improvement in movement, function, and ADLs as indicated by Functional Deficits. [] Progressing: [] Met: [] Not Met: [] Adjusted     Long Term Goals: To be achieved in: 4-6 weeks  1.  Disability index score of 69 or more for the FOTO to assist with reaching prior level of function. [] Progressing: [] Met: [] Not Met: [] Adjusted  2. Patient will demonstrate Good neuromuscular control of core and LE musculature to allow for proper joint functioning as indicated by patients Functional Deficits. [] Progressing: [] Met: [] Not Met: [] Adjusted  3. Patient will demonstrate an increase in Strength to good proximal hip and LE strength and control, to allow for proper gait mechanics for at least 15 minutes without compensatory movement patterns. [] Progressing: [] Met: [] Not Met: [] Adjusted  5. Patient able to negotiate stairs with a recipocal pattern without increased symptoms or compensatory movement patterns. [] Progressing: [] Met: [] Not Met: [] Adjusted          Treatment/Activity Tolerance:  [x] Patient tolerated treatment well [] Patient limited by fatique  [] Patient limited by pain  [] Patient limited by other medical complications  [] Other:     Overall Progression Towards Functional goals/ Treatment Progress Update:  [] Patient is progressing as expected towards functional goals listed. [] Progression is slowed due to complexities/Impairments listed. [] Progression has been slowed due to co-morbidities. [x] Plan just implemented, too soon to assess goals progression <30days   [] Goals require adjustment due to lack of progress  [] Patient is not progressing as expected and requires additional follow up with physician  [] Other    Prognosis for POC: [x] Good [] Fair  [] Poor    Patient requires continued skilled intervention: [x] Yes  [] No        PLAN: Monitor response.     [x] Continue per plan of care [] Alter current plan (see comments)  [] Plan of care initiated [] Hold pending MD visit [] Discharge    Therapeutic Exercise and NMR EXR  [] (83771) Provided verbal/tactile cueing for activities related to strengthening, flexibility, endurance, ROM for improvements in LE, proximal hip, and core control with self care, mobility, lifting, ambulation. [x] (84369) Provided verbal/tactile cueing for activities related to improving balance, coordination, kinesthetic sense, posture, motor skill, proprioception  to assist with LE, proximal hip, and core control in self care, mobility, lifting, ambulation and eccentric single leg control. NMR and Therapeutic Activities:    [x] (54492 or 21021) Provided verbal/tactile cueing for activities related to improving balance, coordination, kinesthetic sense, posture, motor skill, proprioception and motor activation to allow for proper function of core, proximal hip and LE with self care and ADLs and functional mobility.   [] (07638) Gait Re-education- Provided training and instruction to the patient for proper LE, core and proximal hip recruitment and positioning and eccentric body weight control with ambulation re-education including up and down stairs     Home Exercise Program:    [] (58967) Reviewed/Progressed HEP activities related to strengthening, flexibility, endurance, ROM of core, proximal hip and LE for functional self-care, mobility, lifting and ambulation/stair navigation   [x] (27110)Reviewed/Progressed HEP activities related to improving balance, coordination, kinesthetic sense, posture, motor skill, proprioception of core, proximal hip and LE for self care, mobility, lifting, and ambulation/stair navigation      Manual Treatments:  PROM / STM / Oscillations-Mobs:  G-I, II, III, IV (PA's, Inf., Post.)  [] (73775) Provided manual therapy to mobilize LE, proximal hip and/or LS spine soft tissue/joints for the purpose of modulating pain, promoting relaxation,  increasing ROM, reducing/eliminating soft tissue swelling/inflammation/restriction, improving soft tissue extensibility and allowing for proper ROM for normal function with self care, mobility, lifting and ambulation.        Charges:  Timed Code Treatment Minutes: 50   Total Treatment Minutes: 50      [] EVAL (LOW) 64453 (typically 20 minutes face-to-face)  [] EVAL (MOD) 27461 (typically 30 minutes face-to-face)  [] EVAL (HIGH) 44838 (typically 45 minutes face-to-face)  [] RE-EVAL     [x] PU(51672) x     [] Dry needle 1 or 2 Muscles (60775)  [x] NMR (43677) x     [] Dry needle 3+ Muscles (29734)  [x] Manual (33737) x     [] Ultrasound (76679) x  [] TA (90848) x     [] Mech Traction (39116)  [] ES(attended) (03527)     [] ES (un) (98753):   [] Vasopump (72283) [] Ionto (42392)   [] Other:      Electronically signed by: Mariel Costello, PTA 9521    Note: If patient does not return for scheduled/recommended follow up visits, this note will serve as a discharge from care along with the most recent update on progress. Hypoglycemia

## 2022-12-09 ENCOUNTER — APPOINTMENT (OUTPATIENT)
Dept: INFECTIOUS DISEASE | Facility: CLINIC | Age: 72
End: 2022-12-09

## 2022-12-09 VITALS
RESPIRATION RATE: 16 BRPM | TEMPERATURE: 98.9 F | DIASTOLIC BLOOD PRESSURE: 81 MMHG | SYSTOLIC BLOOD PRESSURE: 127 MMHG | OXYGEN SATURATION: 96 % | WEIGHT: 112 LBS | HEIGHT: 67 IN | HEART RATE: 73 BPM | BODY MASS INDEX: 17.58 KG/M2

## 2022-12-09 PROCEDURE — 99214 OFFICE O/P EST MOD 30 MIN: CPT

## 2022-12-09 RX ORDER — AMOXICILLIN 500 MG/1
500 CAPSULE ORAL
Qty: 21 | Refills: 0 | Status: DISCONTINUED | COMMUNITY
Start: 2022-06-16

## 2022-12-09 RX ORDER — SODIUM SULFATE, POTASSIUM SULFATE, MAGNESIUM SULFATE 17.5; 3.13; 1.6 G/ML; G/ML; G/ML
17.5-3.13-1.6 SOLUTION, CONCENTRATE ORAL
Qty: 1 | Refills: 0 | Status: DISCONTINUED | COMMUNITY
Start: 2022-04-05 | End: 2022-12-09

## 2022-12-09 RX ORDER — VORTIOXETINE 20 MG/1
TABLET, FILM COATED ORAL
Refills: 0 | Status: ACTIVE | COMMUNITY

## 2022-12-09 RX ORDER — CEFDINIR 300 MG/1
300 CAPSULE ORAL
Qty: 20 | Refills: 0 | Status: DISCONTINUED | COMMUNITY
Start: 2022-09-06 | End: 2022-12-09

## 2022-12-09 RX ORDER — AMOXICILLIN AND CLAVULANATE POTASSIUM 875; 125 MG/1; MG/1
875-125 TABLET, COATED ORAL
Qty: 20 | Refills: 0 | Status: DISCONTINUED | COMMUNITY
Start: 2022-08-05

## 2022-12-12 ENCOUNTER — NON-APPOINTMENT (OUTPATIENT)
Age: 72
End: 2022-12-12

## 2022-12-12 LAB
ALBUMIN SERPL ELPH-MCNC: 4.6 G/DL
ALP BLD-CCNC: 74 U/L
ALT SERPL-CCNC: 23 U/L
ANION GAP SERPL CALC-SCNC: 11 MMOL/L
AST SERPL-CCNC: 25 U/L
BASOPHILS # BLD AUTO: 0.02 K/UL
BASOPHILS NFR BLD AUTO: 0.3 %
BILIRUB SERPL-MCNC: 0.2 MG/DL
BUN SERPL-MCNC: 25 MG/DL
CALCIUM SERPL-MCNC: 9.9 MG/DL
CHLORIDE SERPL-SCNC: 98 MMOL/L
CO2 SERPL-SCNC: 28 MMOL/L
CREAT SERPL-MCNC: 0.81 MG/DL
DEPRECATED KAPPA LC FREE/LAMBDA SER: 1.76 RATIO
EGFR: 77 ML/MIN/1.73M2
EOSINOPHIL # BLD AUTO: 0.06 K/UL
EOSINOPHIL NFR BLD AUTO: 0.8 %
GLUCOSE SERPL-MCNC: 93 MG/DL
HCT VFR BLD CALC: 42.9 %
HGB BLD-MCNC: 13.7 G/DL
IGA SER QL IEP: 248 MG/DL
IGG SER QL IEP: 1122 MG/DL
IGG SUBSET TOTAL IGG: 1191 MG/DL
IGG1 SER-MCNC: 528 MG/DL
IGG2 SER-MCNC: 542 MG/DL
IGG3 SER-MCNC: 68 MG/DL
IGG4 SER-MCNC: 55 MG/DL
IGM SER QL IEP: 88 MG/DL
IMM GRANULOCYTES NFR BLD AUTO: 0.4 %
KAPPA LC CSF-MCNC: 1.52 MG/DL
KAPPA LC SERPL-MCNC: 2.67 MG/DL
LYMPHOCYTES # BLD AUTO: 1.14 K/UL
LYMPHOCYTES NFR BLD AUTO: 14.6 %
MAN DIFF?: NORMAL
MCHC RBC-ENTMCNC: 31.6 PG
MCHC RBC-ENTMCNC: 31.9 GM/DL
MCV RBC AUTO: 99.1 FL
MONOCYTES # BLD AUTO: 0.63 K/UL
MONOCYTES NFR BLD AUTO: 8.1 %
NEUTROPHILS # BLD AUTO: 5.91 K/UL
NEUTROPHILS NFR BLD AUTO: 75.8 %
PLATELET # BLD AUTO: 191 K/UL
POTASSIUM SERPL-SCNC: 4.3 MMOL/L
PROT SERPL-MCNC: 7.2 G/DL
RBC # BLD: 4.33 M/UL
RBC # FLD: 14.6 %
SODIUM SERPL-SCNC: 138 MMOL/L
WBC # FLD AUTO: 7.79 K/UL

## 2022-12-12 NOTE — CONSULT LETTER
[Dear  ___] : Dear  [unfilled], [Consult Letter:] : I had the pleasure of evaluating your patient, [unfilled]. [Please see my note below.] : Please see my note below. [Consult Closing:] : Thank you very much for allowing me to participate in the care of this patient.  If you have any questions, please do not hesitate to contact me. [Sincerely,] : Sincerely, [FreeTextEntry2] : Dr. Zhao Leo [FreeTextEntry3] : \par No Hernandez MD\par  of Medicine\par Division of Infectious Diseases\par [facility] [employer] [facility] [employer] [facility]\par [address] [place] [address] DrBinu\[place] [address]\par Tel: [phone]\par Fax: [phone]

## 2022-12-12 NOTE — HISTORY OF PRESENT ILLNESS
[FreeTextEntry1] : 72 F dx with bronchiectasis few years ago when she developed a cough.  Presents today for M chimeara/intracellulare positive sputum culture.\par \par Per first visit on 8/22/2022 \par Developed dry cough at that time.\par \par ROS: Brings up phlegm now\par Can spend long stretches coughing\par Likes to avoid social gatherings at times b/c of cough\par Sometimes gets some partial relief with some of her inhalers.\par Can help temporarily and then comes back few hours later. \par Can pull her back b/c of cough.\par \par No cough at sleep. \par No fevers/chills\par Some weight loss but tends to lose weight in the summer and adds weight back in the winter.\par \par She reports she hasn't had much antibiotics. \par \par CT chest: 7/2022.Ground glass nodule in the left apex, 1.5 x 1.3 cm, overall stable compared to 2021.  Possibly increased compared to more remote studies. Persistent multifocal bronchiectasis in both lungs with mucoid impaction. A new ground glass density in the lateral RML tubular nodular denisty in the LLL was noted although other nodular densities seem to be smaller Most notably a nodule in the right apex was smaller on the current study.  \par \par Sputum from 6/20 had 2 cultures positive for M. chimera/intracellularie. 4/2022 was negative on AFB. \par \par 10/7/22:\par Sputum from last visit grew e coli. Treated with omnicef 300 mg po bid x 10 days with some improvement but cough returned.  AFB culture again grew M. chimera/intracellulare.  She started new inhaler though which is helping her and cough is better. She is hesitant to commit to antibiotics for NTM infection.  D/w pulmonary and has planned f/up ct in near future. \par \par 12/9/22:\par Would like to start treatment.\par PFTs worsened over time. \par Using chest vest, hypertonic saline nebs now.

## 2022-12-12 NOTE — ASSESSMENT
[FreeTextEntry1] : 72 F dx with bronchiectasis few years ago when she developed a cough.  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 \par \par She is reporting cough and that it sometimes limits her activities and what she does.  This has waxed and waned over time. \par \par She does meet criteria for treatment with 2 positive cultures and abnormal imaging. I have given her literature to review and take home today.  She is not opposed to treatment if needed for NTM but hesitant due to potential side effects.  She returns today to start treatment.    \par \par She started using the acapella, chest vest and hypertonic saline nebs as well.  \par \par I explained that treatment would require likely 3 medications and we reviewed the side effects.\par She was agreeable to azithromycin and ethambutol, but hesitant to start rifampin due to drug drug interactions which could decrease effectiveness of her insomnia medication. \par \par Rec:\par 1) Continue mucus clearing devices\par 2) Respiratory Inhalers as per pulmonary\par 3) Start azithromycin low dose 250 mg po MWF and Ethambutol 800 mg MWF the second week. Watch for GI upset, bone marrow suppression, hepatotoxicity, vision loss.  Ishihara each visit to be done. Should have ophthal evaluation as well. \par 4) Avoid losing more weight\par 5)  sensitivity on her isolate done and was sensitive to macrolide and amikacin. \par 6) Labs for bronchiectasis today as below\par 7) RTC 4 weeks. \par \par D/w her pulmonologist

## 2022-12-19 LAB
ANNOTATION COMMENT IMP: NORMAL
CFTR MUT TESTED BLD/T: NEGATIVE
ELECTRONIC SIGNATURE: NORMAL
SERPINA1 GENE MUT TESTED BLD/T: NORMAL

## 2022-12-29 RX ORDER — SODIUM CHLORIDE FOR INHALATION 3.5 %
3.5 VIAL, NEBULIZER (ML) INHALATION
Qty: 3 | Refills: 6 | Status: ACTIVE | COMMUNITY
Start: 2022-11-18 | End: 1900-01-01

## 2022-12-29 RX ORDER — ALBUTEROL SULFATE 2.5 MG/3ML
(2.5 MG/3ML) SOLUTION RESPIRATORY (INHALATION)
Qty: 2 | Refills: 6 | Status: ACTIVE | COMMUNITY
Start: 2022-11-18 | End: 1900-01-01

## 2023-01-06 ENCOUNTER — APPOINTMENT (OUTPATIENT)
Dept: INFECTIOUS DISEASE | Facility: CLINIC | Age: 73
End: 2023-01-06
Payer: MEDICARE

## 2023-01-06 VITALS
SYSTOLIC BLOOD PRESSURE: 124 MMHG | HEIGHT: 67 IN | WEIGHT: 112 LBS | BODY MASS INDEX: 17.58 KG/M2 | TEMPERATURE: 97.5 F | HEART RATE: 87 BPM | OXYGEN SATURATION: 95 % | DIASTOLIC BLOOD PRESSURE: 75 MMHG

## 2023-01-06 LAB
ALBUMIN SERPL ELPH-MCNC: 4.2 G/DL
ALP BLD-CCNC: 72 U/L
ALT SERPL-CCNC: 16 U/L
ANION GAP SERPL CALC-SCNC: 12 MMOL/L
AST SERPL-CCNC: 21 U/L
BASOPHILS # BLD AUTO: 0.01 K/UL
BASOPHILS NFR BLD AUTO: 0.2 %
BILIRUB SERPL-MCNC: 0.3 MG/DL
BUN SERPL-MCNC: 21 MG/DL
CALCIUM SERPL-MCNC: 9.3 MG/DL
CHLORIDE SERPL-SCNC: 100 MMOL/L
CO2 SERPL-SCNC: 25 MMOL/L
CREAT SERPL-MCNC: 0.73 MG/DL
EGFR: 87 ML/MIN/1.73M2
EOSINOPHIL # BLD AUTO: 0.08 K/UL
EOSINOPHIL NFR BLD AUTO: 1.3 %
GLUCOSE SERPL-MCNC: 100 MG/DL
HCT VFR BLD CALC: 40.3 %
HGB BLD-MCNC: 13.2 G/DL
IMM GRANULOCYTES NFR BLD AUTO: 0.3 %
LYMPHOCYTES # BLD AUTO: 1.12 K/UL
LYMPHOCYTES NFR BLD AUTO: 18.1 %
MAN DIFF?: NORMAL
MCHC RBC-ENTMCNC: 32.1 PG
MCHC RBC-ENTMCNC: 32.8 GM/DL
MCV RBC AUTO: 98.1 FL
MONOCYTES # BLD AUTO: 0.62 K/UL
MONOCYTES NFR BLD AUTO: 10 %
NEUTROPHILS # BLD AUTO: 4.33 K/UL
NEUTROPHILS NFR BLD AUTO: 70.1 %
PLATELET # BLD AUTO: 162 K/UL
POTASSIUM SERPL-SCNC: 4.4 MMOL/L
PROT SERPL-MCNC: 6.7 G/DL
RBC # BLD: 4.11 M/UL
RBC # FLD: 13.9 %
SODIUM SERPL-SCNC: 137 MMOL/L
WBC # FLD AUTO: 6.18 K/UL

## 2023-01-06 PROCEDURE — 99214 OFFICE O/P EST MOD 30 MIN: CPT

## 2023-01-06 NOTE — REVIEW OF SYSTEMS
[Cough] : cough [Sputum] : coughing up ~M sputum [Negative] : Heme/Lymph [Recent Weight Gain (___ Lbs)] : recent [unfilled] ~Ulb weight gain

## 2023-01-06 NOTE — PHYSICAL EXAM
[General Appearance - Alert] : alert [General Appearance - In No Acute Distress] : in no acute distress [General Appearance - Well-Appearing] : healthy appearing [Sclera] : the sclera and conjunctiva were normal [PERRL With Normal Accommodation] : pupils were equal in size, round, reactive to light [Extraocular Movements] : extraocular movements were intact [Outer Ear] : the ears and nose were normal in appearance [Oropharynx] : the oropharynx was normal with no thrush [Neck Appearance] : the appearance of the neck was normal [Neck Cervical Mass (___cm)] : no neck mass was observed [Jugular Venous Distention Increased] : there was no jugular-venous distention [Thyroid Diffuse Enlargement] : the thyroid was not enlarged [] : no respiratory distress [Heart Rate And Rhythm] : heart rate was normal and rhythm regular [Heart Sounds] : normal S1 and S2 [Heart Sounds Gallop] : no gallops [Murmurs] : no murmurs [Heart Sounds Pericardial Friction Rub] : no pericardial rub [Edema] : there was no peripheral edema [Bowel Sounds] : normal bowel sounds [Abdomen Soft] : soft [Cervical Lymph Nodes Enlarged Posterior Bilaterally] : posterior cervical [Cervical Lymph Nodes Enlarged Anterior Bilaterally] : anterior cervical [Supraclavicular Lymph Nodes Enlarged Bilaterally] : supraclavicular [Musculoskeletal - Swelling] : no joint swelling [Nail Clubbing] : no clubbing  or cyanosis of the fingernails [Motor Tone] : muscle strength and tone were normal [Skin Lesions] : no skin lesions [No Focal Deficits] : no focal deficits [Oriented To Time, Place, And Person] : oriented to person, place, and time [Affect] : the affect was normal [FreeTextEntry1] : RLL ama

## 2023-01-06 NOTE — HISTORY OF PRESENT ILLNESS
[FreeTextEntry1] : 72 F dx with bronchiectasis few years ago when she developed a cough.  Presents today for M chimeara/intracellulare positive sputum culture.\par \par Per first visit on 8/22/2022 \par Developed dry cough at that time.\par No cough at sleep. \par No fevers/chills\par Some weight loss but tends to lose weight in the summer and adds weight back in the winter.\par \par CT chest: 7/2022.Ground glass nodule in the left apex, 1.5 x 1.3 cm, overall stable compared to 2021.  Possibly increased compared to more remote studies. Persistent multifocal bronchiectasis in both lungs with mucoid impaction. A new ground glass density in the lateral RML tubular nodular denisty in the LLL was noted although other nodular densities seem to be smaller Most notably a nodule in the right apex was smaller on the current study.  \par \par Sputum from 6/20 had 2 cultures positive for M. chimera/intracellularie. 4/2022 was negative on AFB. \par \par 10/7/22:\par Sputum from last visit grew e coli. Treated with omnicef 300 mg po bid x 10 days with some improvement but cough returned.  AFB culture again grew M. chimera/intracellulare.  She started new inhaler though which is helping her and cough is better. She is hesitant to commit to antibiotics for NTM infection.  D/w pulmonary and has planned f/up ct in near future. \par \par 12/9/22:\par Would like to start treatment.\par PFTs worsened over time. \par Using chest vest, hypertonic saline nebs now. \par \par 1/6/23:\par Started azithro 250 mg po MWF and Ethambutol 800 mg MWF.\par Doing well. Tolerating meds. Had floater which resolved. Saw ophthalmology initially. No change in vision. \par Not able to make sputum now. Cough is dry and she feels mucus is stuck. \par Using chest vest, saline nebs, and aerobika also. Using ventolin if has to miss nebulizer treatment.

## 2023-01-06 NOTE — CONSULT LETTER
[Dear  ___] : Dear  [unfilled], [Consult Letter:] : I had the pleasure of evaluating your patient, [unfilled]. [Please see my note below.] : Please see my note below. [Consult Closing:] : Thank you very much for allowing me to participate in the care of this patient.  If you have any questions, please do not hesitate to contact me. [Sincerely,] : Sincerely, [FreeTextEntry2] : Dr. Zhao Leo [FreeTextEntry3] : \par No Hernandez MD\par  of Medicine\par Division of Infectious Diseases\par [facility] [employer] [facility] at [facility]\par [address] [place] [address] DrBinu\[place] [address]\par Tel: [phone]\par Fax: [phone]

## 2023-01-06 NOTE — ASSESSMENT
[FreeTextEntry1] : 72 F dx with bronchiectasis few years ago when she developed a cough.  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 \par \par She is reporting cough and that it sometimes limits her activities and what she does.  This has waxed and waned over time. \par \par She does meet criteria for treatment with 2 positive cultures and abnormal imaging. I have given her literature to review and take home today.  She is not opposed to treatment if needed for NTM but hesitant due to potential side effects.  She returns today to start treatment.    \par \par She started using the aerobika, chest vest and hypertonic saline nebs as well.  \par \par I explained that treatment would require likely 3 medications and we reviewed the side effects.\par She was agreeable to azithromycin and ethambutol, but hesitant to start rifampin due to drug drug interactions which could decrease effectiveness of her insomnia medication. \par \par Rec:\par 1) Continue mucus clearing devices\par 2) Respiratory Inhalers as per pulmonary\par 3) Continue azithromycin low dose 250 mg po MWF and Ethambutol 800 mg MWF.. Watch for GI upset, bone marrow suppression, hepatotoxicity, vision loss.  Ishihara each visit to be done. Should have ophthal evaluation as well. Next visit 2/2023.  Ishihara normal today.\par 4) Avoid losing more weight\par 5)  sensitivity on her isolate done and was sensitive to macrolide and amikacin. \par 6) Check CBC and CMP each visit. Add serum immunoelectrophoresis today b/c elevated kappa found. \par 7) RTC 6 weeks. \par \par

## 2023-01-09 ENCOUNTER — NON-APPOINTMENT (OUTPATIENT)
Age: 73
End: 2023-01-09

## 2023-01-12 ENCOUNTER — NON-APPOINTMENT (OUTPATIENT)
Age: 73
End: 2023-01-12

## 2023-01-12 LAB
ALBUMIN MFR SERPL ELPH: 60 %
ALBUMIN SERPL-MCNC: 4.1 G/DL
ALBUMIN/GLOB SERPL: 1.5 RATIO
ALPHA1 GLOB MFR SERPL ELPH: 4 %
ALPHA1 GLOB SERPL ELPH-MCNC: 0.3 G/DL
ALPHA2 GLOB MFR SERPL ELPH: 9 %
ALPHA2 GLOB SERPL ELPH-MCNC: 0.6 G/DL
B-GLOBULIN MFR SERPL ELPH: 11.3 %
B-GLOBULIN SERPL ELPH-MCNC: 0.8 G/DL
DEPRECATED KAPPA LC FREE/LAMBDA SER: 2.17 RATIO
GAMMA GLOB FLD ELPH-MCNC: 1.1 G/DL
GAMMA GLOB MFR SERPL ELPH: 15.7 %
IGA SER QL IEP: 204 MG/DL
IGG SER QL IEP: 863 MG/DL
IGM SER QL IEP: 82 MG/DL
INTERPRETATION SERPL IEP-IMP: NORMAL
KAPPA LC CSF-MCNC: 1 MG/DL
KAPPA LC SERPL-MCNC: 2.17 MG/DL
M PROTEIN SPEC IFE-MCNC: NORMAL
PROT SERPL-MCNC: 6.9 G/DL
PROT SERPL-MCNC: 6.9 G/DL

## 2023-02-14 ENCOUNTER — APPOINTMENT (OUTPATIENT)
Dept: INFECTIOUS DISEASE | Facility: CLINIC | Age: 73
End: 2023-02-14
Payer: MEDICARE

## 2023-02-14 VITALS
OXYGEN SATURATION: 94 % | HEART RATE: 75 BPM | HEIGHT: 67 IN | TEMPERATURE: 98.5 F | WEIGHT: 112 LBS | RESPIRATION RATE: 16 BRPM | DIASTOLIC BLOOD PRESSURE: 76 MMHG | SYSTOLIC BLOOD PRESSURE: 132 MMHG | BODY MASS INDEX: 17.58 KG/M2

## 2023-02-14 PROCEDURE — 99213 OFFICE O/P EST LOW 20 MIN: CPT

## 2023-02-14 NOTE — ASSESSMENT
[FreeTextEntry1] : 72 F dx with bronchiectasis few years ago when she developed a cough.  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 \par \par She is reporting cough and that it sometimes limits her activities and what she does.  This has waxed and waned over time. \par \par She does meet criteria for treatment with 2 positive cultures and abnormal imaging. I have given her literature to review and take home today.  She is not opposed to treatment if needed for NTM but hesitant due to potential side effects.  She returns today to start treatment.    \par \par She started using the aerobika, chest vest and hypertonic saline nebs as well.  \par \par I explained that treatment would require likely 3 medications and we reviewed the side effects.\par She was agreeable to azithromycin and ethambutol, but hesitant to start rifampin due to drug drug interactions which could decrease effectiveness of her insomnia medication. \par \par Rec:\par 1) Continue mucus clearing devices\par 2) Respiratory Inhalers as per pulmonary\par 3) Continue azithromycin low dose 250 mg po MWF and Ethambutol 800 mg MWF.. Watch for GI upset, bone marrow suppression, hepatotoxicity, vision loss.  Ishihara each visit to be done. Should have ophthal evaluation as well.   Ishihara normal today.\par 4) Avoid losing more weight\par 5)  sensitivity on her isolate done and was sensitive to macrolide and amikacin. \par 6) Check CBC and CMP each visit. Add serum immunoelectrophoresis today b/c elevated kappa found. \par 7) RTC 8  weeks. \par \par

## 2023-02-14 NOTE — CONSULT LETTER
[Dear  ___] : Dear  [unfilled], [Consult Letter:] : I had the pleasure of evaluating your patient, [unfilled]. [Please see my note below.] : Please see my note below. [Consult Closing:] : Thank you very much for allowing me to participate in the care of this patient.  If you have any questions, please do not hesitate to contact me. [Sincerely,] : Sincerely, [FreeTextEntry2] : Dr. Guzman Spencermid [FreeTextEntry3] : \par No Hernandez MD\par  of Medicine\par Division of Infectious Diseases\par [facility] [employer] [facility] at [facility]\par [address] [place] [address] DrBinu\[place] [address]\par Tel: [phone]\par Fax: [phone]

## 2023-02-14 NOTE — HISTORY OF PRESENT ILLNESS
[FreeTextEntry1] : 72 F dx with bronchiectasis few years ago when she developed a cough.  Presents today for M chimeara/intracellulare positive sputum culture.\par \par Per first visit on 8/22/2022 \par Developed dry cough at that time.\par No cough at sleep. \par No fevers/chills\par Some weight loss but tends to lose weight in the summer and adds weight back in the winter.\par \par CT chest: 7/2022.Ground glass nodule in the left apex, 1.5 x 1.3 cm, overall stable compared to 2021.  Possibly increased compared to more remote studies. Persistent multifocal bronchiectasis in both lungs with mucoid impaction. A new ground glass density in the lateral RML tubular nodular denisty in the LLL was noted although other nodular densities seem to be smaller Most notably a nodule in the right apex was smaller on the current study.  \par \par Sputum from 6/20 had 2 cultures positive for M. chimera/intracellularie. 4/2022 was negative on AFB. \par \par 10/7/22:\par Sputum from last visit grew e coli. Treated with omnicef 300 mg po bid x 10 days with some improvement but cough returned.  AFB culture again grew M. chimera/intracellulare.  She started new inhaler though which is helping her and cough is better. She is hesitant to commit to antibiotics for NTM infection.  D/w pulmonary and has planned f/up ct in near future. \par \par 12/9/22:\par Would like to start treatment.\par PFTs worsened over time. \par Using chest vest, hypertonic saline nebs now. \par \par 1/6/23:\par Started azithro 250 mg po MWF and Ethambutol 800 mg MWF.\par Doing well. Tolerating meds. Had floater which resolved. Saw ophthalmology initially. No change in vision. \par Not able to make sputum now. Cough is dry and she feels mucus is stuck. \par Using chest vest, saline nebs, and aerobika also. Using ventolin if has to miss nebulizer treatment. \par \par 2/14/23:\par Doing well. Tolerating medications. Tired today b/c mom was sick. \par Cough mostly dry, can bring up small amount of sputum at times. \par Had to reschedule Ophthal evaluation b/c was dizzy the day of the appt.

## 2023-02-14 NOTE — PHYSICAL EXAM
[General Appearance - Alert] : alert [General Appearance - In No Acute Distress] : in no acute distress [General Appearance - Well-Appearing] : healthy appearing [Sclera] : the sclera and conjunctiva were normal [PERRL With Normal Accommodation] : pupils were equal in size, round, reactive to light [Extraocular Movements] : extraocular movements were intact [Outer Ear] : the ears and nose were normal in appearance [Oropharynx] : the oropharynx was normal with no thrush [Neck Appearance] : the appearance of the neck was normal [Neck Cervical Mass (___cm)] : no neck mass was observed [Jugular Venous Distention Increased] : there was no jugular-venous distention [Thyroid Diffuse Enlargement] : the thyroid was not enlarged [] : no respiratory distress [Heart Rate And Rhythm] : heart rate was normal and rhythm regular [Heart Sounds] : normal S1 and S2 [Heart Sounds Gallop] : no gallops [Murmurs] : no murmurs [Heart Sounds Pericardial Friction Rub] : no pericardial rub [Edema] : there was no peripheral edema [Bowel Sounds] : normal bowel sounds [Abdomen Soft] : soft [Cervical Lymph Nodes Enlarged Posterior Bilaterally] : posterior cervical [Cervical Lymph Nodes Enlarged Anterior Bilaterally] : anterior cervical [Supraclavicular Lymph Nodes Enlarged Bilaterally] : supraclavicular [Musculoskeletal - Swelling] : no joint swelling [Nail Clubbing] : no clubbing  or cyanosis of the fingernails [Motor Tone] : muscle strength and tone were normal [Skin Lesions] : no skin lesions [No Focal Deficits] : no focal deficits [Oriented To Time, Place, And Person] : oriented to person, place, and time [Affect] : the affect was normal [FreeTextEntry1] : RLL wheeze, cleared with deep breaths

## 2023-02-14 NOTE — REVIEW OF SYSTEMS
[Recent Weight Gain (___ Lbs)] : recent [unfilled] ~Ulb weight gain [Cough] : cough [Sputum] : coughing up ~M sputum [Negative] : Heme/Lymph

## 2023-02-17 LAB
ALBUMIN SERPL ELPH-MCNC: 4.5 G/DL
ALP BLD-CCNC: 72 U/L
ALT SERPL-CCNC: 21 U/L
ANION GAP SERPL CALC-SCNC: 12 MMOL/L
AST SERPL-CCNC: 23 U/L
BASOPHILS # BLD AUTO: 0.03 K/UL
BASOPHILS NFR BLD AUTO: 0.5 %
BILIRUB SERPL-MCNC: 0.3 MG/DL
BUN SERPL-MCNC: 22 MG/DL
CALCIUM SERPL-MCNC: 10 MG/DL
CHLORIDE SERPL-SCNC: 99 MMOL/L
CO2 SERPL-SCNC: 26 MMOL/L
CREAT SERPL-MCNC: 0.79 MG/DL
EGFR: 79 ML/MIN/1.73M2
EOSINOPHIL # BLD AUTO: 0.05 K/UL
EOSINOPHIL NFR BLD AUTO: 0.8 %
GLUCOSE SERPL-MCNC: 94 MG/DL
HCT VFR BLD CALC: 41.5 %
HGB BLD-MCNC: 13.1 G/DL
IMM GRANULOCYTES NFR BLD AUTO: 0.2 %
LYMPHOCYTES # BLD AUTO: 0.95 K/UL
LYMPHOCYTES NFR BLD AUTO: 15.5 %
MAN DIFF?: NORMAL
MCHC RBC-ENTMCNC: 31.5 PG
MCHC RBC-ENTMCNC: 31.6 GM/DL
MCV RBC AUTO: 99.8 FL
MONOCYTES # BLD AUTO: 0.58 K/UL
MONOCYTES NFR BLD AUTO: 9.5 %
NEUTROPHILS # BLD AUTO: 4.5 K/UL
NEUTROPHILS NFR BLD AUTO: 73.5 %
PLATELET # BLD AUTO: 168 K/UL
POTASSIUM SERPL-SCNC: 5 MMOL/L
PROT SERPL-MCNC: 7.1 G/DL
RBC # BLD: 4.16 M/UL
RBC # FLD: 13.7 %
SODIUM SERPL-SCNC: 138 MMOL/L
WBC # FLD AUTO: 6.12 K/UL

## 2023-03-02 ENCOUNTER — APPOINTMENT (OUTPATIENT)
Dept: RHEUMATOLOGY | Facility: CLINIC | Age: 73
End: 2023-03-02
Payer: MEDICARE

## 2023-03-02 VITALS
WEIGHT: 112 LBS | SYSTOLIC BLOOD PRESSURE: 137 MMHG | DIASTOLIC BLOOD PRESSURE: 77 MMHG | TEMPERATURE: 97 F | HEART RATE: 81 BPM | HEIGHT: 67 IN | BODY MASS INDEX: 17.58 KG/M2 | OXYGEN SATURATION: 95 %

## 2023-03-02 PROCEDURE — 96372 THER/PROPH/DIAG INJ SC/IM: CPT

## 2023-03-02 RX ADMIN — DENOSUMAB 0 MG/ML: 60 INJECTION SUBCUTANEOUS at 00:00

## 2023-03-05 RX ORDER — DENOSUMAB 60 MG/ML
60 INJECTION SUBCUTANEOUS
Qty: 1 | Refills: 0 | Status: COMPLETED | OUTPATIENT
Start: 2023-03-02

## 2023-03-05 NOTE — PROCEDURE
[Other Date:___] : Date: [unfilled] [Soft Tissue Injection] : soft tissue injection was performed [Patient] : the patient [Risks] : risks [Consent Obtained] : written consent was obtained prior to the procedure and is detailed in the patient's record [Therapeutic] : therapeutic [#1 Site: ______] : #1 site identified in the [unfilled] [de-identified] : prolia 60 mg subcutaneously [FreeTextEntry1] : buy and bill

## 2023-03-16 ENCOUNTER — APPOINTMENT (OUTPATIENT)
Dept: RHEUMATOLOGY | Facility: CLINIC | Age: 73
End: 2023-03-16

## 2023-04-13 LAB — ACID FAST STN SPT: NORMAL

## 2023-04-28 ENCOUNTER — APPOINTMENT (OUTPATIENT)
Dept: INFECTIOUS DISEASE | Facility: CLINIC | Age: 73
End: 2023-04-28
Payer: MEDICARE

## 2023-04-28 VITALS
OXYGEN SATURATION: 96 % | HEIGHT: 67 IN | TEMPERATURE: 97.5 F | SYSTOLIC BLOOD PRESSURE: 131 MMHG | HEART RATE: 81 BPM | DIASTOLIC BLOOD PRESSURE: 80 MMHG | WEIGHT: 112 LBS | BODY MASS INDEX: 17.58 KG/M2

## 2023-04-28 PROCEDURE — 99213 OFFICE O/P EST LOW 20 MIN: CPT

## 2023-04-28 NOTE — PHYSICAL EXAM
[General Appearance - Alert] : alert [General Appearance - In No Acute Distress] : in no acute distress [General Appearance - Well-Appearing] : healthy appearing [Sclera] : the sclera and conjunctiva were normal [PERRL With Normal Accommodation] : pupils were equal in size, round, reactive to light [Extraocular Movements] : extraocular movements were intact [Outer Ear] : the ears and nose were normal in appearance [Oropharynx] : the oropharynx was normal with no thrush [Neck Appearance] : the appearance of the neck was normal [Neck Cervical Mass (___cm)] : no neck mass was observed [Jugular Venous Distention Increased] : there was no jugular-venous distention [Thyroid Diffuse Enlargement] : the thyroid was not enlarged [] : no respiratory distress [Heart Rate And Rhythm] : heart rate was normal and rhythm regular [Heart Sounds] : normal S1 and S2 [Heart Sounds Gallop] : no gallops [Murmurs] : no murmurs [Heart Sounds Pericardial Friction Rub] : no pericardial rub [Edema] : there was no peripheral edema [Bowel Sounds] : normal bowel sounds [Abdomen Soft] : soft [Cervical Lymph Nodes Enlarged Posterior Bilaterally] : posterior cervical [Cervical Lymph Nodes Enlarged Anterior Bilaterally] : anterior cervical [Supraclavicular Lymph Nodes Enlarged Bilaterally] : supraclavicular [Musculoskeletal - Swelling] : no joint swelling [Nail Clubbing] : no clubbing  or cyanosis of the fingernails [Skin Lesions] : no skin lesions [Motor Tone] : muscle strength and tone were normal [No Focal Deficits] : no focal deficits [Oriented To Time, Place, And Person] : oriented to person, place, and time [Affect] : the affect was normal [FreeTextEntry1] : RLL wheeze, cleared with deep breaths

## 2023-04-28 NOTE — CONSULT LETTER
[Dear  ___] : Dear  [unfilled], [Consult Letter:] : I had the pleasure of evaluating your patient, [unfilled]. [Please see my note below.] : Please see my note below. [Consult Closing:] : Thank you very much for allowing me to participate in the care of this patient.  If you have any questions, please do not hesitate to contact me. [Sincerely,] : Sincerely, [FreeTextEntry2] : Dr. Guzman Spencermid [FreeTextEntry3] : \par No Hernandez MD\par  of Medicine\par Division of Infectious Diseases\par [facility] [employer] [facility]\par [address] [place] [address] [address]u\[place] [address]\par Tel: [phone]\par Fax: [phone]

## 2023-04-28 NOTE — REVIEW OF SYSTEMS
[Cough] : cough [Sputum] : coughing up ~M sputum [Negative] : Heme/Lymph [Recent Weight Gain (___ Lbs)] : no recent weight gain

## 2023-04-28 NOTE — HISTORY OF PRESENT ILLNESS
[FreeTextEntry1] : 72 F dx with bronchiectasis few years ago when she developed a cough.  Presents today for M chimeara/intracellulare positive sputum culture.\par \par Per first visit on 8/22/2022 \par Developed dry cough at that time.\par No cough at sleep. \par No fevers/chills\par Some weight loss but tends to lose weight in the summer and adds weight back in the winter.\par \par CT chest: 7/2022.Ground glass nodule in the left apex, 1.5 x 1.3 cm, overall stable compared to 2021.  Possibly increased compared to more remote studies. Persistent multifocal bronchiectasis in both lungs with mucoid impaction. A new ground glass density in the lateral RML tubular nodular denisty in the LLL was noted although other nodular densities seem to be smaller Most notably a nodule in the right apex was smaller on the current study.  \par \par Sputum from 6/20 had 2 cultures positive for M. chimera/intracellularie. 4/2022 was negative on AFB. \par \par 10/7/22:\par Sputum from last visit grew e coli. Treated with omnicef 300 mg po bid x 10 days with some improvement but cough returned.  AFB culture again grew M. chimera/intracellulare.  She started new inhaler though which is helping her and cough is better. She is hesitant to commit to antibiotics for NTM infection.  D/w pulmonary and has planned f/up ct in near future. \par \par 12/9/22:\par Would like to start treatment.\par PFTs worsened over time. \par Using chest vest, hypertonic saline nebs now. \par \par 1/6/23:\par Started azithro 250 mg po MWF and Ethambutol 800 mg MWF 12/9/2022\par Doing well. Tolerating meds. Had floater which resolved. Saw ophthalmology initially. No change in vision. \par Not able to make sputum now. Cough is dry and she feels mucus is stuck. \par Using chest vest, saline nebs, and aerobika also. Using ventolin if has to miss nebulizer treatment. \par \par 2/14/23:\par Doing well. Tolerating medications. Tired today b/c mom was sick. \par Cough mostly dry, can bring up small amount of sputum at times. \par Had to reschedule Ophthal evaluation b/c was dizzy the day of the appt. \par \par 4/28/2023:\par Doing well. Tolerating azithro/ethambutol. No change in vision. Gets floaters at times.\par Has nasal drip, back pain, and feels she is more hungry.  Also gets some upper abdominal discomfort with heavy coughing.  \par AFB culture from 2/24/2023 was w/o growth.  Labs have been stable. Ishihara has been normal.

## 2023-04-28 NOTE — ASSESSMENT
[FreeTextEntry1] : 72 F dx with bronchiectasis few years ago when she developed a cough.  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 \par \par She is reporting cough and that it sometimes limits her activities and what she does.  This has waxed and waned over time. \par \par She does meet criteria for treatment with 2 positive cultures and abnormal imaging. I have given her literature to review and take home today.  She is not opposed to treatment if needed for NTM but hesitant due to potential side effects.  She returns today to start treatment.    \par \par She started using the aerobika, chest vest and hypertonic saline nebs as well.  \par \par I explained that treatment would require likely 3 medications and we reviewed the side effects.\par She was agreeable to azithromycin and ethambutol, but hesitant to start rifampin due to drug drug interactions which could decrease effectiveness of her insomnia medication. \par \par Rec:\par 1) Continue mucus clearing devices\par 2) Respiratory Inhalers as per pulmonary\par 3) Continue azithromycin low dose 250 mg po MWF and Ethambutol 800 mg MWF.. Watch for GI upset, bone marrow suppression, hepatotoxicity, vision loss.  Ishihara each visit to be done. Should have ophthal evaluation as well.   Ishihara normal today.\par 4) Avoid losing more weight\par 5)  sensitivity on her isolate done and was sensitive to macrolide and amikacin. \par 6) Check CBC and CMP each visit. Check sputum each visit if able. Sputum first negative 2/2023. If this continues, would treat for 1 year from first negative sputum culture.  \par 7) RTC 8  weeks. \par \par

## 2023-05-01 LAB
ALBUMIN SERPL ELPH-MCNC: 4.3 G/DL
ALP BLD-CCNC: 74 U/L
ALT SERPL-CCNC: 22 U/L
ANION GAP SERPL CALC-SCNC: 12 MMOL/L
AST SERPL-CCNC: 23 U/L
BILIRUB SERPL-MCNC: 0.3 MG/DL
BUN SERPL-MCNC: 20 MG/DL
CALCIUM SERPL-MCNC: 9.7 MG/DL
CHLORIDE SERPL-SCNC: 99 MMOL/L
CO2 SERPL-SCNC: 25 MMOL/L
CREAT SERPL-MCNC: 0.75 MG/DL
EGFR: 84 ML/MIN/1.73M2
GLUCOSE SERPL-MCNC: 89 MG/DL
POTASSIUM SERPL-SCNC: 4.5 MMOL/L
PROT SERPL-MCNC: 6.9 G/DL
SODIUM SERPL-SCNC: 136 MMOL/L

## 2023-05-31 ENCOUNTER — APPOINTMENT (OUTPATIENT)
Dept: RHEUMATOLOGY | Facility: CLINIC | Age: 73
End: 2023-05-31
Payer: MEDICARE

## 2023-05-31 VITALS
OXYGEN SATURATION: 95 % | BODY MASS INDEX: 17.58 KG/M2 | DIASTOLIC BLOOD PRESSURE: 76 MMHG | TEMPERATURE: 97.7 F | WEIGHT: 112 LBS | HEART RATE: 72 BPM | SYSTOLIC BLOOD PRESSURE: 132 MMHG | HEIGHT: 67 IN

## 2023-05-31 PROCEDURE — 99213 OFFICE O/P EST LOW 20 MIN: CPT

## 2023-06-14 NOTE — PHYSICAL EXAM
[General Appearance - Alert] : alert [General Appearance - In No Acute Distress] : in no acute distress [General Appearance - Well Nourished] : well nourished [Oriented To Time, Place, And Person] : oriented to person, place, and time [FreeTextEntry1] : TTP lumbar spine. Hand OA changes [] : no rash [Sensation] : the sensory exam was normal to light touch and pinprick [Motor Exam] : the motor exam was normal

## 2023-06-14 NOTE — HISTORY OF PRESENT ILLNESS
[FreeTextEntry1] : \par # Joint pain\par Shortly after getting most recent Prolia injection in 3/2023, patient developed generalized pain in her "bones"/joints. She has since been feeling well. She has chronic low back pain, but that has been under control, she has not been to [facility] recently. She denies joint swelling or stiffness. She is very active. Hand pain is worse after activity.\par \par # Osteoporosis\par OP for many years\par initially on fosamax then boniva off for a few years, since 2015\par Last DEXA 8/2022 T score -1.5\par \par

## 2023-06-14 NOTE — PROCEDURE
[Other Date:___] : Date: [unfilled] [Therapeutic] : therapeutic [#1 Site: ______] : #1 site identified in the [unfilled] [de-identified] : Prolia injection

## 2023-06-14 NOTE — ASSESSMENT
[FreeTextEntry1] : 71F with OP coming for Prolia injection.\par \par # Joint pain\par appears degenerative in nature. \par low clinical suspicion for inflammatory arthritis\par recommend PT\par diclofenac gel\par Hand exercises\par -PT referral\par -Tensile putty\par \par Paraffin wax\par \par Diclofenac gel 1%\par Tylenol arthritis 1 tab up to 3 x daily as needed\par Turmeric 500mg twice daily\par Glucosamine chondroitin sulphate\par \par \par \par # Osteoporosis\par -Prolia 3/2023\par -DEXA 8/2022 femoral T score -/1.4\par - check CBC CMP VitD \par \par RTC in 6months

## 2023-06-30 ENCOUNTER — APPOINTMENT (OUTPATIENT)
Dept: INFECTIOUS DISEASE | Facility: CLINIC | Age: 73
End: 2023-06-30
Payer: MEDICARE

## 2023-06-30 VITALS
HEIGHT: 67 IN | OXYGEN SATURATION: 95 % | DIASTOLIC BLOOD PRESSURE: 74 MMHG | HEART RATE: 80 BPM | SYSTOLIC BLOOD PRESSURE: 125 MMHG | TEMPERATURE: 97.8 F | WEIGHT: 111 LBS | BODY MASS INDEX: 17.42 KG/M2

## 2023-06-30 LAB
ALBUMIN SERPL ELPH-MCNC: 4.6 G/DL
ALP BLD-CCNC: 71 U/L
ALT SERPL-CCNC: 19 U/L
ANION GAP SERPL CALC-SCNC: 14 MMOL/L
AST SERPL-CCNC: 23 U/L
BILIRUB SERPL-MCNC: 0.3 MG/DL
BUN SERPL-MCNC: 24 MG/DL
CALCIUM SERPL-MCNC: 10 MG/DL
CHLORIDE SERPL-SCNC: 101 MMOL/L
CO2 SERPL-SCNC: 24 MMOL/L
CREAT SERPL-MCNC: 0.79 MG/DL
EGFR: 79 ML/MIN/1.73M2
GLUCOSE SERPL-MCNC: 89 MG/DL
POTASSIUM SERPL-SCNC: 5.3 MMOL/L
PROT SERPL-MCNC: 7.2 G/DL
SODIUM SERPL-SCNC: 138 MMOL/L

## 2023-06-30 PROCEDURE — 99213 OFFICE O/P EST LOW 20 MIN: CPT

## 2023-06-30 NOTE — HISTORY OF PRESENT ILLNESS
[FreeTextEntry1] : 72 F dx with bronchiectasis few years ago when she developed a cough.  Presents today for M chimeara/intracellulare positive sputum culture.\par \par Per first visit on 8/22/2022 \par Developed dry cough at that time.\par No cough at sleep. \par No fevers/chills\par Some weight loss but tends to lose weight in the summer and adds weight back in the winter.\par \par CT chest: 7/2022.Ground glass nodule in the left apex, 1.5 x 1.3 cm, overall stable compared to 2021.  Possibly increased compared to more remote studies. Persistent multifocal bronchiectasis in both lungs with mucoid impaction. A new ground glass density in the lateral RML tubular nodular denisty in the LLL was noted although other nodular densities seem to be smaller Most notably a nodule in the right apex was smaller on the current study.  \par \par Sputum from 6/20 had 2 cultures positive for M. chimera/intracellularie. 4/2022 was negative on AFB. \par \par 10/7/22:\par Sputum from last visit grew e coli. Treated with omnicef 300 mg po bid x 10 days with some improvement but cough returned.  AFB culture again grew M. chimera/intracellulare.  She started new inhaler though which is helping her and cough is better. She is hesitant to commit to antibiotics for NTM infection.  D/w pulmonary and has planned f/up ct in near future. \par \par 12/9/22:\par Would like to start treatment.\par PFTs worsened over time. \par Using chest vest, hypertonic saline nebs now. \par \par 1/6/23:\par Started azithro 250 mg po MWF and Ethambutol 800 mg MWF 12/9/2022\par Doing well. Tolerating meds. Had floater which resolved. Saw ophthalmology initially. No change in vision. \par Not able to make sputum now. Cough is dry and she feels mucus is stuck. \par Using chest vest, saline nebs, and aerobika also. Using ventolin if has to miss nebulizer treatment. \par \par 2/14/23:\par Doing well. Tolerating medications. Tired today b/c mom was sick. \par Cough mostly dry, can bring up small amount of sputum at times. \par Had to reschedule Ophthal evaluation b/c was dizzy the day of the appt. \par \par 4/28/2023:\par Doing well. Tolerating azithro/ethambutol. No change in vision. Gets floaters at times.\par Has nasal drip, back pain, and feels she is more hungry.  Also gets some upper abdominal discomfort with heavy coughing.  \par AFB culture from 2/24/2023 was w/o growth.  Labs have been stable. Ishihara has been normal. \par \par 6/30/20223:\par Reports some dizziness\par Needs to see eye doctor\par AFB negative 2/2023 and 5/2023.\par To get ct chest early july\par Does feel overall better since start of medication.  But still reports she doesn't feel well.  \par Has chronic back pain, when lies down start to cough.

## 2023-06-30 NOTE — PHYSICAL EXAM
[General Appearance - Alert] : alert [General Appearance - In No Acute Distress] : in no acute distress [General Appearance - Well-Appearing] : healthy appearing [Sclera] : the sclera and conjunctiva were normal [PERRL With Normal Accommodation] : pupils were equal in size, round, reactive to light [Extraocular Movements] : extraocular movements were intact [Outer Ear] : the ears and nose were normal in appearance [Oropharynx] : the oropharynx was normal with no thrush [Neck Appearance] : the appearance of the neck was normal [Neck Cervical Mass (___cm)] : no neck mass was observed [Jugular Venous Distention Increased] : there was no jugular-venous distention [Thyroid Diffuse Enlargement] : the thyroid was not enlarged [] : no respiratory distress [Heart Rate And Rhythm] : heart rate was normal and rhythm regular [Heart Sounds] : normal S1 and S2 [Heart Sounds Gallop] : no gallops [Murmurs] : no murmurs [Heart Sounds Pericardial Friction Rub] : no pericardial rub [Edema] : there was no peripheral edema [Bowel Sounds] : normal bowel sounds [Abdomen Soft] : soft [Cervical Lymph Nodes Enlarged Posterior Bilaterally] : posterior cervical [Cervical Lymph Nodes Enlarged Anterior Bilaterally] : anterior cervical [Supraclavicular Lymph Nodes Enlarged Bilaterally] : supraclavicular [Musculoskeletal - Swelling] : no joint swelling [Nail Clubbing] : no clubbing  or cyanosis of the fingernails [Motor Tone] : muscle strength and tone were normal [Skin Lesions] : no skin lesions [No Focal Deficits] : no focal deficits [Oriented To Time, Place, And Person] : oriented to person, place, and time [Affect] : the affect was normal [Auscultation Breath Sounds / Voice Sounds] : lungs were clear to auscultation bilaterally [FreeTextEntry1] : No wheeze today

## 2023-06-30 NOTE — CONSULT LETTER
[Dear  ___] : Dear  [unfilled], [Consult Letter:] : I had the pleasure of evaluating your patient, [unfilled]. [Please see my note below.] : Please see my note below. [Consult Closing:] : Thank you very much for allowing me to participate in the care of this patient.  If you have any questions, please do not hesitate to contact me. [Sincerely,] : Sincerely, [FreeTextEntry2] : Dr. Guzman Spencermid [FreeTextEntry3] : \par No Hernandez MD\par  of Medicine\par Division of Infectious Diseases\par [facility] [employer] [facility]\par [address] [place] [address] [address]u\[place] [address]\par Tel: [phone]\par Fax: [phone]

## 2023-06-30 NOTE — ASSESSMENT
[FreeTextEntry1] : 72 F dx with bronchiectasis few years ago when she developed a cough.  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 \par \par She is reporting cough and that it sometimes limits her activities and what she does.  This has waxed and waned over time. \par \par She does meet criteria for treatment with 2 positive cultures and abnormal imaging. I have given her literature to review and take home today.  She is not opposed to treatment if needed for NTM but hesitant due to potential side effects.  She returns today to start treatment.    \par \par She started using the aerobika, chest vest and hypertonic saline nebs as well.  \par \par I explained that treatment would require likely 3 medications and we reviewed the side effects.\par She was agreeable to azithromycin and ethambutol, but hesitant to start rifampin due to drug drug interactions which could decrease effectiveness of her insomnia medication. \par \par Rec:\par 1) Continue mucus clearing devices\par 2) Respiratory Inhalers as per pulmonary\par 3) Continue azithromycin low dose 250 mg po MWF and Ethambutol 800 mg MWF.. Watch for GI upset, bone marrow suppression, hepatotoxicity, vision loss.  Ishihara each visit to be done. Should have ophthal evaluation as well.   Ishihara normal today.\par 4) Avoid losing more weight\par 5)  sensitivity on her isolate done and was sensitive to macrolide and amikacin. \par 6) Check CBC and CMP each visit. Check sputum each visit if able. Sputum first negative 2/2023. If this continues, would treat for 1 year from first negative sputum culture. \par 7) CT chest next week \par 8) RTC 8-10  weeks. \par \par

## 2023-07-10 LAB — ACID FAST STN SPT: NORMAL

## 2023-08-08 NOTE — ED PROVIDER NOTE - NEUROLOGICAL SPEECH
clear Protopic Counseling: Patient may experience a mild burning sensation during topical application. Protopic is not approved in children less than 2 years of age. There have been case reports of hematologic and skin malignancies in patients using topical calcineurin inhibitors although causality is questionable.

## 2023-09-07 ENCOUNTER — APPOINTMENT (OUTPATIENT)
Dept: RHEUMATOLOGY | Facility: CLINIC | Age: 73
End: 2023-09-07
Payer: MEDICARE

## 2023-09-07 VITALS
OXYGEN SATURATION: 95 % | RESPIRATION RATE: 16 BRPM | SYSTOLIC BLOOD PRESSURE: 124 MMHG | TEMPERATURE: 97.3 F | DIASTOLIC BLOOD PRESSURE: 73 MMHG | HEART RATE: 80 BPM

## 2023-09-07 DIAGNOSIS — M81.0 AGE-RELATED OSTEOPOROSIS W/OUT CURRENT PATHOLOGICAL FRACTURE: ICD-10-CM

## 2023-09-07 PROCEDURE — 96372 THER/PROPH/DIAG INJ SC/IM: CPT

## 2023-09-07 RX ADMIN — DENOSUMAB 0 MG/ML: 60 INJECTION SUBCUTANEOUS at 00:00

## 2023-09-08 RX ORDER — DENOSUMAB 60 MG/ML
60 INJECTION SUBCUTANEOUS
Qty: 1 | Refills: 0 | Status: COMPLETED | OUTPATIENT
Start: 2023-09-07

## 2023-09-22 ENCOUNTER — APPOINTMENT (OUTPATIENT)
Dept: INFECTIOUS DISEASE | Facility: CLINIC | Age: 73
End: 2023-09-22
Payer: MEDICARE

## 2023-09-22 VITALS
SYSTOLIC BLOOD PRESSURE: 132 MMHG | HEART RATE: 79 BPM | HEIGHT: 67 IN | WEIGHT: 114 LBS | BODY MASS INDEX: 17.89 KG/M2 | TEMPERATURE: 97.7 F | OXYGEN SATURATION: 95 % | DIASTOLIC BLOOD PRESSURE: 83 MMHG

## 2023-09-22 DIAGNOSIS — Z23 ENCOUNTER FOR IMMUNIZATION: ICD-10-CM

## 2023-09-22 LAB
BASOPHILS # BLD AUTO: 0.03 K/UL
BASOPHILS NFR BLD AUTO: 0.4 %
EOSINOPHIL # BLD AUTO: 0.09 K/UL
EOSINOPHIL NFR BLD AUTO: 1.3 %
HCT VFR BLD CALC: 41.4 %
HGB BLD-MCNC: 13.7 G/DL
IMM GRANULOCYTES NFR BLD AUTO: 0.7 %
LYMPHOCYTES # BLD AUTO: 1.05 K/UL
LYMPHOCYTES NFR BLD AUTO: 15 %
MAN DIFF?: NORMAL
MCHC RBC-ENTMCNC: 31.5 PG
MCHC RBC-ENTMCNC: 33.1 GM/DL
MCV RBC AUTO: 95.2 FL
MONOCYTES # BLD AUTO: 0.61 K/UL
MONOCYTES NFR BLD AUTO: 8.7 %
NEUTROPHILS # BLD AUTO: 5.19 K/UL
NEUTROPHILS NFR BLD AUTO: 73.9 %
PLATELET # BLD AUTO: 192 K/UL
RBC # BLD: 4.35 M/UL
RBC # FLD: 14.1 %
WBC # FLD AUTO: 7.02 K/UL

## 2023-09-22 PROCEDURE — G0008: CPT

## 2023-09-22 PROCEDURE — 90662 IIV NO PRSV INCREASED AG IM: CPT

## 2023-09-22 RX ORDER — ETHAMBUTOL HYDROCHLORIDE 400 MG/1
400 TABLET ORAL
Qty: 30 | Refills: 5 | Status: ACTIVE | COMMUNITY
Start: 2022-12-09 | End: 1900-01-01

## 2023-09-25 LAB
ALBUMIN SERPL ELPH-MCNC: 4.6 G/DL
ALP BLD-CCNC: 100 U/L
ALT SERPL-CCNC: 21 U/L
ANION GAP SERPL CALC-SCNC: 13 MMOL/L
AST SERPL-CCNC: 24 U/L
BILIRUB SERPL-MCNC: 0.2 MG/DL
BUN SERPL-MCNC: 32 MG/DL
CALCIUM SERPL-MCNC: 9.5 MG/DL
CHLORIDE SERPL-SCNC: 97 MMOL/L
CO2 SERPL-SCNC: 25 MMOL/L
CREAT SERPL-MCNC: 0.82 MG/DL
EGFR: 75 ML/MIN/1.73M2
GLUCOSE SERPL-MCNC: 93 MG/DL
POTASSIUM SERPL-SCNC: 5 MMOL/L
PROT SERPL-MCNC: 7 G/DL
SODIUM SERPL-SCNC: 135 MMOL/L

## 2023-09-25 NOTE — REASON FOR VISIT
-- DO NOT REPLY / DO NOT REPLY ALL --  -- Message is from Engagement Center Operations (ECO) --    General Patient Message: Patient is requesting that a call be made to [facility]  to confirm the urgency of the appointment  on Tuesday 9/26. [phone]    Caller Information       Type Contact Phone/Fax    09/25/2023 09:47 AM CDT Phone (Incoming) Melanie Corbett (Self) [phone] (M)        Alternative phone number: [phone]    Can a detailed message be left? Yes    Message Turnaround: [facility]:    Refer to site's KB page for routing instructions    Please give this turnaround time to the caller:   \"You can expect to receive a response 2-3 business days after your provider's clinical team reviews the message\"               [Initial] : an initial visit [Bronchiectasis] : bronchiectasis

## 2023-09-27 ENCOUNTER — APPOINTMENT (OUTPATIENT)
Dept: ULTRASOUND IMAGING | Facility: CLINIC | Age: 73
End: 2023-09-27
Payer: MEDICARE

## 2023-09-27 ENCOUNTER — APPOINTMENT (OUTPATIENT)
Dept: MAMMOGRAPHY | Facility: CLINIC | Age: 73
End: 2023-09-27
Payer: MEDICARE

## 2023-09-27 PROCEDURE — 77067 SCR MAMMO BI INCL CAD: CPT

## 2023-09-27 PROCEDURE — 77063 BREAST TOMOSYNTHESIS BI: CPT

## 2023-09-27 PROCEDURE — 76641 ULTRASOUND BREAST COMPLETE: CPT | Mod: 50,GY

## 2023-12-15 ENCOUNTER — APPOINTMENT (OUTPATIENT)
Dept: INFECTIOUS DISEASE | Facility: CLINIC | Age: 73
End: 2023-12-15
Payer: MEDICARE

## 2023-12-15 VITALS
WEIGHT: 118 LBS | HEART RATE: 85 BPM | OXYGEN SATURATION: 97 % | TEMPERATURE: 98.3 F | HEIGHT: 67 IN | BODY MASS INDEX: 18.52 KG/M2 | SYSTOLIC BLOOD PRESSURE: 125 MMHG | DIASTOLIC BLOOD PRESSURE: 72 MMHG

## 2023-12-15 LAB
ALBUMIN SERPL ELPH-MCNC: 4.3 G/DL
ALP BLD-CCNC: 82 U/L
ALT SERPL-CCNC: 20 U/L
ANION GAP SERPL CALC-SCNC: 12 MMOL/L
AST SERPL-CCNC: 21 U/L
BASOPHILS # BLD AUTO: 0.03 K/UL
BASOPHILS NFR BLD AUTO: 0.5 %
BILIRUB SERPL-MCNC: 0.2 MG/DL
BUN SERPL-MCNC: 23 MG/DL
CALCIUM SERPL-MCNC: 9.4 MG/DL
CHLORIDE SERPL-SCNC: 100 MMOL/L
CO2 SERPL-SCNC: 25 MMOL/L
CREAT SERPL-MCNC: 0.81 MG/DL
EGFR: 76 ML/MIN/1.73M2
EOSINOPHIL # BLD AUTO: 0.06 K/UL
EOSINOPHIL NFR BLD AUTO: 1 %
GLUCOSE SERPL-MCNC: 98 MG/DL
HCT VFR BLD CALC: 43.4 %
HGB BLD-MCNC: 14 G/DL
IMM GRANULOCYTES NFR BLD AUTO: 0.3 %
LYMPHOCYTES # BLD AUTO: 1.08 K/UL
LYMPHOCYTES NFR BLD AUTO: 17.7 %
MAN DIFF?: NORMAL
MCHC RBC-ENTMCNC: 32 PG
MCHC RBC-ENTMCNC: 32.3 GM/DL
MCV RBC AUTO: 99.1 FL
MONOCYTES # BLD AUTO: 0.61 K/UL
MONOCYTES NFR BLD AUTO: 10 %
NEUTROPHILS # BLD AUTO: 4.29 K/UL
NEUTROPHILS NFR BLD AUTO: 70.5 %
PLATELET # BLD AUTO: 199 K/UL
POTASSIUM SERPL-SCNC: 4.8 MMOL/L
PROT SERPL-MCNC: 6.7 G/DL
RBC # BLD: 4.38 M/UL
RBC # FLD: 13.7 %
SODIUM SERPL-SCNC: 138 MMOL/L
WBC # FLD AUTO: 6.09 K/UL

## 2023-12-15 PROCEDURE — 99213 OFFICE O/P EST LOW 20 MIN: CPT

## 2023-12-15 NOTE — HISTORY OF PRESENT ILLNESS
[FreeTextEntry1] : 73 F dx with bronchiectasis few years ago when she developed a cough.  Presents today for M chimeara/intracellulare positive sputum culture.  Per first visit on 8/22/2022  Developed dry cough at that time. No cough at sleep.  No fevers/chills Some weight loss but tends to lose weight in the summer and adds weight back in the winter.  CT chest: 7/2022.Ground glass nodule in the left apex, 1.5 x 1.3 cm, overall stable compared to 2021.  Possibly increased compared to more remote studies. Persistent multifocal bronchiectasis in both lungs with mucoid impaction. A new ground glass density in the lateral RML tubular nodular denisty in the LLL was noted although other nodular densities seem to be smaller Most notably a nodule in the right apex was smaller on the current study.    Sputum from 6/20 had 2 cultures positive for M. chimera/intracellularie. 4/2022 was negative on AFB.   10/7/22: Sputum from last visit grew e coli. Treated with omnicef 300 mg po bid x 10 days with some improvement but cough returned.  AFB culture again grew M. chimera/intracellulare.  She started new inhaler though which is helping her and cough is better. She is hesitant to commit to antibiotics for NTM infection.  D/w pulmonary and has planned f/up ct in near future.   12/9/22: Would like to start treatment. PFTs worsened over time.  Using chest vest, hypertonic saline nebs now.   1/6/23: Started azithro 250 mg po MWF and Ethambutol 800 mg MWF 12/9/2022 Doing well. Tolerating meds. Had floater which resolved. Saw ophthalmology initially. No change in vision.  Not able to make sputum now. Cough is dry and she feels mucus is stuck.  Using chest vest, saline nebs, and aerobika also. Using ventolin if has to miss nebulizer treatment.   2/14/23: Doing well. Tolerating medications. Tired today b/c mom was sick.  Cough mostly dry, can bring up small amount of sputum at times.  Had to reschedule Ophthal evaluation b/c was dizzy the day of the appt.   4/28/2023: Doing well. Tolerating azithro/ethambutol. No change in vision. Gets floaters at times. Has nasal drip, back pain, and feels she is more hungry.  Also gets some upper abdominal discomfort with heavy coughing.   AFB culture from 2/24/2023 was w/o growth.  Labs have been stable. Ishihara has been normal.   6/30/20223: Reports some dizziness Needs to see eye doctor AFB negative 2/2023 and 5/2023. To get ct chest early july Does feel overall better since start of medication.  But still reports she doesn't feel well.   Has chronic back pain, when lies down start to cough.    9/22/2023: Doing okay. Feels dizzy at times. Has good and bad days with cough and mucus production. Taking meds. No change in vision.   AFB negative 2/2023 and 5/2023. Started meds 1/2023.  CT chest 7/2023 and due again 12/2023.  12/15/2023: Having some left upper back pain. Not sure if it is positional. Her mother passed away 11/17 at 89 years old.  She has been home more since this. No increased cough.  Today did not get to do her mucus clearing so has increased cough today. No fevers/chills. Can bring up some sputum at times.

## 2023-12-15 NOTE — ASSESSMENT
[FreeTextEntry1] :  73 F dx with bronchiectasis few years ago when she developed a cough. 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  She is reporting cough and that it sometimes limits her activities and what she does. This has waxed and waned over time.  She does met criteria for treatment with 2 positive cultures and abnormal imaging. I had given her literature to review and take home today. She is not opposed to treatment if needed for NTM but initially was hesitant due to potential side effects.  She started using the aerobika, chest vest and hypertonic saline nebs as well.  I explained that treatment would require likely 3 medications and we reviewed the side effects. She was agreeable to azithromycin and ethambutol, but hesitant to start rifampin due to drug drug interactions which could decrease effectiveness of her insomnia medication.  Rec: 1) Continue mucus clearing devices 2) Respiratory Inhalers as per pulmonary 3) Continue azithromycin low dose 250 mg po MWF and Ethambutol 800 mg MWF.. Watch for GI upset, bone marrow suppression, hepatotoxicity, vision loss. Esme each visit to be done. Should have ophthal evaluation as well. Ishkrish normal today.  On meds since 12/2022.   4) Avoid losing more weight 5) sensitivity on her isolate done and was sensitive to macrolide and amikacin. 6) Check CBC and CMP each visit. Check sputum each visit if able. Sputum first negative 2/2023. If this continues, would treat for 1 year from first negative sputum culture.  Hope to get more sputum prior to ending therapy. 7) CT chest due 12/2023 as per pulmonary note, pt says 3/2024 - will await ct chest results.  8) High dose flu vaccine last visit 9) Back pain - ?etiology. Does not appear ill, VSS. No signs of pneumonia, ?musculoskeletal.  Pt will monitor for now.  RTC 2 months.

## 2023-12-15 NOTE — CONSULT LETTER
[Dear  ___] : Dear  [unfilled], [Consult Letter:] : I had the pleasure of evaluating your patient, [unfilled]. [Please see my note below.] : Please see my note below. [Consult Closing:] : Thank you very much for allowing me to participate in the care of this patient.  If you have any questions, please do not hesitate to contact me. [Sincerely,] : Sincerely, [FreeTextEntry2] : Dr. Guzman Spencermid [FreeTextEntry3] : \par  No Hernandez MD\par   of Medicine\par  Division of Infectious Diseases\par  [facility] [employer] [facility]\par  [address] [place] [address] DrBinu\[place] [address]\par  Tel: [phone]\par  Fax: [phone]

## 2024-02-23 ENCOUNTER — APPOINTMENT (OUTPATIENT)
Dept: INFECTIOUS DISEASE | Facility: CLINIC | Age: 74
End: 2024-02-23
Payer: MEDICARE

## 2024-02-23 VITALS
BODY MASS INDEX: 18.48 KG/M2 | DIASTOLIC BLOOD PRESSURE: 65 MMHG | OXYGEN SATURATION: 95 % | WEIGHT: 118 LBS | TEMPERATURE: 97.2 F | HEART RATE: 82 BPM | SYSTOLIC BLOOD PRESSURE: 123 MMHG

## 2024-02-23 LAB
ALBUMIN SERPL ELPH-MCNC: 4.2 G/DL
ALP BLD-CCNC: 84 U/L
ALT SERPL-CCNC: 19 U/L
ANION GAP SERPL CALC-SCNC: 11 MMOL/L
AST SERPL-CCNC: 23 U/L
BASOPHILS # BLD AUTO: 0.03 K/UL
BASOPHILS NFR BLD AUTO: 0.7 %
BILIRUB SERPL-MCNC: 0.2 MG/DL
BUN SERPL-MCNC: 21 MG/DL
CALCIUM SERPL-MCNC: 9.3 MG/DL
CHLORIDE SERPL-SCNC: 101 MMOL/L
CO2 SERPL-SCNC: 26 MMOL/L
CREAT SERPL-MCNC: 0.77 MG/DL
EGFR: 81 ML/MIN/1.73M2
EOSINOPHIL # BLD AUTO: 0.1 K/UL
EOSINOPHIL NFR BLD AUTO: 2.3 %
GLUCOSE SERPL-MCNC: 97 MG/DL
HCT VFR BLD CALC: 41.4 %
HGB BLD-MCNC: 13.6 G/DL
IMM GRANULOCYTES NFR BLD AUTO: 0.5 %
LYMPHOCYTES # BLD AUTO: 0.98 K/UL
LYMPHOCYTES NFR BLD AUTO: 22.3 %
MAN DIFF?: NORMAL
MCHC RBC-ENTMCNC: 31.6 PG
MCHC RBC-ENTMCNC: 32.9 GM/DL
MCV RBC AUTO: 96.1 FL
MONOCYTES # BLD AUTO: 0.58 K/UL
MONOCYTES NFR BLD AUTO: 13.2 %
NEUTROPHILS # BLD AUTO: 2.68 K/UL
NEUTROPHILS NFR BLD AUTO: 61 %
PLATELET # BLD AUTO: 183 K/UL
POTASSIUM SERPL-SCNC: 5.1 MMOL/L
PROT SERPL-MCNC: 6.8 G/DL
RBC # BLD: 4.31 M/UL
RBC # FLD: 13.7 %
SODIUM SERPL-SCNC: 137 MMOL/L
WBC # FLD AUTO: 4.39 K/UL

## 2024-02-23 PROCEDURE — 99213 OFFICE O/P EST LOW 20 MIN: CPT

## 2024-02-26 NOTE — CONSULT LETTER
[Dear  ___] : Dear  [unfilled], [Consult Letter:] : I had the pleasure of evaluating your patient, [unfilled]. [Please see my note below.] : Please see my note below. [Consult Closing:] : Thank you very much for allowing me to participate in the care of this patient.  If you have any questions, please do not hesitate to contact me. [Sincerely,] : Sincerely, [FreeTextEntry2] : Dr. Guzman Spencermid [FreeTextEntry3] : \par  No Hernandez MD\par   of Medicine\par  Division of Infectious Diseases\par  [facility] [facility] at [facility]\par  [address] [place] [address] DrBinu\[place] [address]\par  Tel: [phone]\par  Fax: [phone]

## 2024-02-26 NOTE — PHYSICAL EXAM
[General Appearance - Alert] : alert [General Appearance - In No Acute Distress] : in no acute distress [General Appearance - Well-Appearing] : healthy appearing [Sclera] : the sclera and conjunctiva were normal [PERRL With Normal Accommodation] : pupils were equal in size, round, reactive to light [Extraocular Movements] : extraocular movements were intact [Oropharynx] : the oropharynx was normal with no thrush [Outer Ear] : the ears and nose were normal in appearance [Neck Appearance] : the appearance of the neck was normal [Neck Cervical Mass (___cm)] : no neck mass was observed [Jugular Venous Distention Increased] : there was no jugular-venous distention [Thyroid Diffuse Enlargement] : the thyroid was not enlarged [] : no respiratory distress [Auscultation Breath Sounds / Voice Sounds] : lungs were clear to auscultation bilaterally [Heart Rate And Rhythm] : heart rate was normal and rhythm regular [Heart Sounds] : normal S1 and S2 [Heart Sounds Gallop] : no gallops [Murmurs] : no murmurs [Heart Sounds Pericardial Friction Rub] : no pericardial rub [Edema] : there was no peripheral edema [Bowel Sounds] : normal bowel sounds [Abdomen Soft] : soft [Cervical Lymph Nodes Enlarged Posterior Bilaterally] : posterior cervical [Cervical Lymph Nodes Enlarged Anterior Bilaterally] : anterior cervical [Supraclavicular Lymph Nodes Enlarged Bilaterally] : supraclavicular [Musculoskeletal - Swelling] : no joint swelling [Nail Clubbing] : no clubbing  or cyanosis of the fingernails [Motor Tone] : muscle strength and tone were normal [Skin Lesions] : no skin lesions [No Focal Deficits] : no focal deficits [Oriented To Time, Place, And Person] : oriented to person, place, and time [Affect] : the affect was normal [FreeTextEntry1] : No wheeze today

## 2024-02-26 NOTE — ASSESSMENT
[FreeTextEntry1] :  73 F dx with bronchiectasis few years ago when she developed a cough. 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  She is reporting cough and that it sometimes limits her activities and what she does. This has waxed and waned over time.  She does met criteria for treatment with 2 positive cultures and abnormal imaging. I had given her literature to review and take home today. She is not opposed to treatment if needed for NTM but initially was hesitant due to potential side effects.  She started using the aerobika, chest vest and hypertonic saline nebs as well.  I explained that treatment would require likely 3 medications and we reviewed the side effects. She was agreeable to azithromycin and ethambutol, but hesitant to start rifampin due to drug drug interactions which could decrease effectiveness of her insomnia medication.  Rec: 1) Continue mucus clearing devices 2) Respiratory Inhalers as per pulmonary 3) Continue azithromycin low dose 250 mg po MWF and Ethambutol 800 mg MWF.. Watch for GI upset, bone marrow suppression, hepatotoxicity, vision loss. Esme each visit to be done. Should have ophthal evaluation as well. Esme normal today.  On meds since 12/2022.   4) Avoid losing more weight 5) sensitivity on her isolate done and was sensitive to macrolide and amikacin. 6) Check CBC and CMP each visit. Check sputum each visit if able. Sputum first negative 2/2023. If this continues, would treat for 1 year from first negative sputum culture.  Hope to get more sputum prior to ending therapy. 7) CT chest due 12/2023 as per pulmonary note, pt says 3/2024 - will await ct chest results prior to deciding on end date for therapy. 8) High dose flu vaccine given this season.  RTC 2 months.

## 2024-02-26 NOTE — HISTORY OF PRESENT ILLNESS
[FreeTextEntry1] : 73 F dx with bronchiectasis few years ago when she developed a cough.  Presents today for M chimeara/intracellulare positive sputum culture.  Per first visit on 8/22/2022  Developed dry cough at that time. No cough at sleep.  No fevers/chills Some weight loss but tends to lose weight in the summer and adds weight back in the winter.  CT chest: 7/2022.Ground glass nodule in the left apex, 1.5 x 1.3 cm, overall stable compared to 2021.  Possibly increased compared to more remote studies. Persistent multifocal bronchiectasis in both lungs with mucoid impaction. A new ground glass density in the lateral RML tubular nodular denisty in the LLL was noted although other nodular densities seem to be smaller Most notably a nodule in the right apex was smaller on the current study.    Sputum from 6/20 had 2 cultures positive for M. chimera/intracellularie. 4/2022 was negative on AFB.   10/7/22: Sputum from last visit grew e coli. Treated with omnicef 300 mg po bid x 10 days with some improvement but cough returned.  AFB culture again grew M. chimera/intracellulare.  She started new inhaler though which is helping her and cough is better. She is hesitant to commit to antibiotics for NTM infection.  D/w pulmonary and has planned f/up ct in near future.   12/9/22: Would like to start treatment. PFTs worsened over time.  Using chest vest, hypertonic saline nebs now.   1/6/23: Started azithro 250 mg po MWF and Ethambutol 800 mg MWF 12/9/2022 Doing well. Tolerating meds. Had floater which resolved. Saw ophthalmology initially. No change in vision.  Not able to make sputum now. Cough is dry and she feels mucus is stuck.  Using chest vest, saline nebs, and aerobika also. Using ventolin if has to miss nebulizer treatment.   2/14/23: Doing well. Tolerating medications. Tired today b/c mom was sick.  Cough mostly dry, can bring up small amount of sputum at times.  Had to reschedule Ophthal evaluation b/c was dizzy the day of the appt.   4/28/2023: Doing well. Tolerating azithro/ethambutol. No change in vision. Gets floaters at times. Has nasal drip, back pain, and feels she is more hungry.  Also gets some upper abdominal discomfort with heavy coughing.   AFB culture from 2/24/2023 was w/o growth.  Labs have been stable. Ishihara has been normal.   6/30/20223: Reports some dizziness Needs to see eye doctor AFB negative 2/2023 and 5/2023. To get ct chest early july Does feel overall better since start of medication.  But still reports she doesn't feel well.   Has chronic back pain, when lies down start to cough.    9/22/2023: Doing okay. Feels dizzy at times. Has good and bad days with cough and mucus production. Taking meds. No change in vision.   AFB negative 2/2023 and 5/2023. Started meds 1/2023.  CT chest 7/2023 and due again 12/2023.  12/15/2023: Having some left upper back pain. Not sure if it is positional. Her mother passed away 11/17 at 107 years old.  She has been home more since this. No increased cough.  Today did not get to do her mucus clearing so has increased cough today. No fevers/chills. Can bring up some sputum at times.    2/23/24: Has some increased cough.  Uses vit C which helps her. She feels best in the the morning. Says she has cough but mucus not really coming up. Black pepper corns have triggered cough.  Likes to eat it on salmon.   No change in vision. Has CT chest 3/2024 and f/up pulmonary 4/2024.  Not able to bring back any sputum for me.

## 2024-03-14 ENCOUNTER — APPOINTMENT (OUTPATIENT)
Dept: RHEUMATOLOGY | Facility: CLINIC | Age: 74
End: 2024-03-14
Payer: MEDICARE

## 2024-03-14 VITALS
DIASTOLIC BLOOD PRESSURE: 81 MMHG | RESPIRATION RATE: 15 BRPM | WEIGHT: 118 LBS | SYSTOLIC BLOOD PRESSURE: 146 MMHG | HEIGHT: 67 IN | OXYGEN SATURATION: 93 % | BODY MASS INDEX: 18.52 KG/M2 | HEART RATE: 78 BPM

## 2024-03-14 DIAGNOSIS — M81.0 AGE-RELATED OSTEOPOROSIS W/OUT CURRENT PATHOLOGICAL FRACTURE: ICD-10-CM

## 2024-03-14 DIAGNOSIS — M25.552 PAIN IN RIGHT HIP: ICD-10-CM

## 2024-03-14 DIAGNOSIS — M25.551 PAIN IN RIGHT HIP: ICD-10-CM

## 2024-03-14 PROCEDURE — 96372 THER/PROPH/DIAG INJ SC/IM: CPT

## 2024-03-14 PROCEDURE — 99213 OFFICE O/P EST LOW 20 MIN: CPT | Mod: 25

## 2024-03-14 RX ORDER — DENOSUMAB 60 MG/ML
60 INJECTION SUBCUTANEOUS
Qty: 1 | Refills: 0 | Status: ACTIVE | COMMUNITY
Start: 2019-11-08 | End: 1900-01-01

## 2024-03-14 RX ADMIN — DENOSUMAB 0 MG/ML: 60 INJECTION SUBCUTANEOUS at 00:00

## 2024-03-15 RX ORDER — DENOSUMAB 60 MG/ML
60 INJECTION SUBCUTANEOUS
Qty: 1 | Refills: 0 | Status: COMPLETED | OUTPATIENT
Start: 2024-03-14

## 2024-03-31 NOTE — ASSESSMENT
[FreeTextEntry1] : 71F with OP coming for Prolia injection.  # Hip pain -hip xrays PT referral  # Osteoporosis -Prolia today -DEXA 8/2022 femoral T score -1.4 - repeat in 8/2024 - check CBC CMP VitD   RTC in 6months

## 2024-03-31 NOTE — PHYSICAL EXAM
[General Appearance - Alert] : alert [General Appearance - In No Acute Distress] : in no acute distress [General Appearance - Well Nourished] : well nourished [FreeTextEntry1] : TTP lumbar spine. Hand OA changes [] : no rash [Motor Exam] : the motor exam was normal [Sensation] : the sensory exam was normal to light touch and pinprick [Oriented To Time, Place, And Person] : oriented to person, place, and time

## 2024-04-24 ENCOUNTER — NON-APPOINTMENT (OUTPATIENT)
Age: 74
End: 2024-04-24

## 2024-04-24 RX ORDER — AZITHROMYCIN 250 MG/1
250 TABLET, FILM COATED ORAL
Qty: 12 | Refills: 3 | Status: ACTIVE | COMMUNITY
Start: 2022-12-09 | End: 1900-01-01

## 2024-05-03 ENCOUNTER — APPOINTMENT (OUTPATIENT)
Dept: INFECTIOUS DISEASE | Facility: CLINIC | Age: 74
End: 2024-05-03
Payer: MEDICARE

## 2024-05-03 VITALS
WEIGHT: 118 LBS | SYSTOLIC BLOOD PRESSURE: 127 MMHG | BODY MASS INDEX: 18.52 KG/M2 | HEIGHT: 67 IN | TEMPERATURE: 97.8 F | HEART RATE: 76 BPM | DIASTOLIC BLOOD PRESSURE: 84 MMHG | OXYGEN SATURATION: 95 %

## 2024-05-03 LAB
ALBUMIN SERPL ELPH-MCNC: 4.4 G/DL
ALP BLD-CCNC: 68 U/L
ALT SERPL-CCNC: 17 U/L
ANION GAP SERPL CALC-SCNC: 12 MMOL/L
AST SERPL-CCNC: 25 U/L
BASOPHILS # BLD AUTO: 0.03 K/UL
BASOPHILS NFR BLD AUTO: 0.6 %
BILIRUB SERPL-MCNC: 0.3 MG/DL
BUN SERPL-MCNC: 11 MG/DL
CALCIUM SERPL-MCNC: 9.2 MG/DL
CHLORIDE SERPL-SCNC: 99 MMOL/L
CO2 SERPL-SCNC: 24 MMOL/L
CREAT SERPL-MCNC: 0.75 MG/DL
EGFR: 83 ML/MIN/1.73M2
EOSINOPHIL # BLD AUTO: 0.06 K/UL
EOSINOPHIL NFR BLD AUTO: 1.1 %
GLUCOSE SERPL-MCNC: 102 MG/DL
HCT VFR BLD CALC: 40.1 %
HGB BLD-MCNC: 12.9 G/DL
IMM GRANULOCYTES NFR BLD AUTO: 0.6 %
LYMPHOCYTES # BLD AUTO: 0.97 K/UL
LYMPHOCYTES NFR BLD AUTO: 18.3 %
MAN DIFF?: NORMAL
MCHC RBC-ENTMCNC: 30.9 PG
MCHC RBC-ENTMCNC: 32.2 GM/DL
MCV RBC AUTO: 95.9 FL
MONOCYTES # BLD AUTO: 0.52 K/UL
MONOCYTES NFR BLD AUTO: 9.8 %
NEUTROPHILS # BLD AUTO: 3.68 K/UL
NEUTROPHILS NFR BLD AUTO: 69.6 %
PLATELET # BLD AUTO: 174 K/UL
POTASSIUM SERPL-SCNC: 4.4 MMOL/L
PROT SERPL-MCNC: 6.7 G/DL
RBC # BLD: 4.18 M/UL
RBC # FLD: 14.3 %
SODIUM SERPL-SCNC: 135 MMOL/L
WBC # FLD AUTO: 5.29 K/UL

## 2024-05-03 PROCEDURE — 99213 OFFICE O/P EST LOW 20 MIN: CPT

## 2024-05-07 NOTE — CONSULT LETTER
[Dear  ___] : Dear  [unfilled], [Consult Letter:] : I had the pleasure of evaluating your patient, [unfilled]. [Please see my note below.] : Please see my note below. [Consult Closing:] : Thank you very much for allowing me to participate in the care of this patient.  If you have any questions, please do not hesitate to contact me. [Sincerely,] : Sincerely, [FreeTextEntry2] : Dr. Guzman Spencermid [FreeTextEntry3] : \par  No Hernandez MD\par   of Medicine\par  Division of Infectious Diseases\par  [facility] [employer] [facility] at [facility]\par  [address] [place] [address] DrBinu\[place] [address]\par  Tel: [phone]\par  Fax: [phone]

## 2024-05-07 NOTE — ASSESSMENT
[FreeTextEntry1] :  73 F dx with bronchiectasis few years ago when she developed a cough. 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  She is reporting cough and that it sometimes limits her activities and what she does. This has waxed and waned over time.  She does met criteria for treatment with 2 positive cultures and abnormal imaging. I had given her literature to review and take home today. She is not opposed to treatment if needed for NTM but initially was hesitant due to potential side effects.  She started using the aerobika, chest vest and hypertonic saline nebs as well.  Has not been readily using the hypersal though.   I explained that treatment would require likely 3 medications and we reviewed the side effects. She was agreeable to azithromycin and ethambutol, but hesitant to start rifampin due to drug drug interactions which could decrease effectiveness of her insomnia medication.  Rec: 1) Continue mucus clearing devices - increase hypersal use.  2) Respiratory Inhalers as per pulmonary 3) Continue azithromycin low dose 250 mg po MWF and Ethambutol 800 mg MWF.. Watch for GI upset, bone marrow suppression, hepatotoxicity, vision loss. Ishihara each visit to be done. Should have ophthal evaluation as well. Ishihara normal today.  On meds since 12/2022.   4) Avoid losing more weight 5) sensitivity on her isolate done and was sensitive to macrolide and amikacin. 6) Check CBC and CMP each visit. Check sputum each visit if able. Sputum first negative 2/2023. Hope to get more sputum prior to ending therapy.  She feels she has no side effects and would like to stay on meds longer. Okay to continue for now. Will try to get more sputum and d/w pulmonary on duration. ?18months-  2 years seems reasonable.  7) CT chest 3/2024 was stable. Not much improvement and not worse. 8) High dose flu vaccine given this season.  RTC 2 months.

## 2024-05-07 NOTE — HISTORY OF PRESENT ILLNESS
[FreeTextEntry1] : 73 F dx with bronchiectasis few years ago when she developed a cough.  Presents today for M chimeara/intracellulare positive sputum culture.  Per first visit on 8/22/2022  Developed dry cough at that time. No cough at sleep.  No fevers/chills Some weight loss but tends to lose weight in the summer and adds weight back in the winter.  CT chest: 7/2022.Ground glass nodule in the left apex, 1.5 x 1.3 cm, overall stable compared to 2021.  Possibly increased compared to more remote studies. Persistent multifocal bronchiectasis in both lungs with mucoid impaction. A new ground glass density in the lateral RML tubular nodular denisty in the LLL was noted although other nodular densities seem to be smaller Most notably a nodule in the right apex was smaller on the current study.    Sputum from 6/20 had 2 cultures positive for M. chimera/intracellularie. 4/2022 was negative on AFB.   10/7/22: Sputum from last visit grew e coli. Treated with omnicef 300 mg po bid x 10 days with some improvement but cough returned.  AFB culture again grew M. chimera/intracellulare.  She started new inhaler though which is helping her and cough is better. She is hesitant to commit to antibiotics for NTM infection.  D/w pulmonary and has planned f/up ct in near future.   12/9/22: Would like to start treatment. PFTs worsened over time.  Using chest vest, hypertonic saline nebs now.   1/6/23: Started azithro 250 mg po MWF and Ethambutol 800 mg MWF 12/9/2022 Doing well. Tolerating meds. Had floater which resolved. Saw ophthalmology initially. No change in vision.  Not able to make sputum now. Cough is dry and she feels mucus is stuck.  Using chest vest, saline nebs, and aerobika also. Using ventolin if has to miss nebulizer treatment.   2/14/23: Doing well. Tolerating medications. Tired today b/c mom was sick.  Cough mostly dry, can bring up small amount of sputum at times.  Had to reschedule Ophthal evaluation b/c was dizzy the day of the appt.   4/28/2023: Doing well. Tolerating azithro/ethambutol. No change in vision. Gets floaters at times. Has nasal drip, back pain, and feels she is more hungry.  Also gets some upper abdominal discomfort with heavy coughing.   AFB culture from 2/24/2023 was w/o growth.  Labs have been stable. Ishihara has been normal.   6/30/20223: Reports some dizziness Needs to see eye doctor AFB negative 2/2023 and 5/2023. To get ct chest early july Does feel overall better since start of medication.  But still reports she doesn't feel well.   Has chronic back pain, when lies down start to cough.    9/22/2023: Doing okay. Feels dizzy at times. Has good and bad days with cough and mucus production. Taking meds. No change in vision.   AFB negative 2/2023 and 5/2023. Started meds 1/2023.  CT chest 7/2023 and due again 12/2023.  12/15/2023: Having some left upper back pain. Not sure if it is positional. Her mother passed away 11/17 at 107 years old.  She has been home more since this. No increased cough.  Today did not get to do her mucus clearing so has increased cough today. No fevers/chills. Can bring up some sputum at times.    2/23/24: Has some increased cough.  Uses vit C which helps her. She feels best in the the morning. Says she has cough but mucus not really coming up. Black pepper corns have triggered cough.  Likes to eat it on salmon.   No change in vision. Has CT chest 3/2024 and f/up pulmonary 4/2024.  Not able to bring back any sputum for me.    5/3/24: + cough. Not bringing up much phlegm. Cough is burdernsome.  CT chest: 3/6/24: Relatively stable. Scanned in. Unchanged bronchiectasis and mucus plugging.   Not using hypersal but will go back to trying it to help clear mucus.

## 2024-05-07 NOTE — PHYSICAL EXAM
[General Appearance - Alert] : alert [General Appearance - In No Acute Distress] : in no acute distress [General Appearance - Well-Appearing] : healthy appearing [Sclera] : the sclera and conjunctiva were normal [PERRL With Normal Accommodation] : pupils were equal in size, round, reactive to light [Extraocular Movements] : extraocular movements were intact [Outer Ear] : the ears and nose were normal in appearance [Neck Appearance] : the appearance of the neck was normal [Oropharynx] : the oropharynx was normal with no thrush [Neck Cervical Mass (___cm)] : no neck mass was observed [Jugular Venous Distention Increased] : there was no jugular-venous distention [Thyroid Diffuse Enlargement] : the thyroid was not enlarged [] : no respiratory distress [Auscultation Breath Sounds / Voice Sounds] : lungs were clear to auscultation bilaterally [Heart Sounds] : normal S1 and S2 [Heart Rate And Rhythm] : heart rate was normal and rhythm regular [Heart Sounds Gallop] : no gallops [Murmurs] : no murmurs [Heart Sounds Pericardial Friction Rub] : no pericardial rub [Edema] : there was no peripheral edema [Bowel Sounds] : normal bowel sounds [Abdomen Soft] : soft [Cervical Lymph Nodes Enlarged Posterior Bilaterally] : posterior cervical [Cervical Lymph Nodes Enlarged Anterior Bilaterally] : anterior cervical [Supraclavicular Lymph Nodes Enlarged Bilaterally] : supraclavicular [Musculoskeletal - Swelling] : no joint swelling [Nail Clubbing] : no clubbing  or cyanosis of the fingernails [Motor Tone] : muscle strength and tone were normal [Skin Lesions] : no skin lesions [No Focal Deficits] : no focal deficits [Oriented To Time, Place, And Person] : oriented to person, place, and time [Affect] : the affect was normal [FreeTextEntry1] : RLL ama

## 2024-07-12 ENCOUNTER — NON-APPOINTMENT (OUTPATIENT)
Age: 74
End: 2024-07-12

## 2024-07-30 ENCOUNTER — APPOINTMENT (OUTPATIENT)
Dept: INFECTIOUS DISEASE | Facility: CLINIC | Age: 74
End: 2024-07-30
Payer: MEDICARE

## 2024-07-30 VITALS
OXYGEN SATURATION: 94 % | DIASTOLIC BLOOD PRESSURE: 78 MMHG | HEART RATE: 77 BPM | TEMPERATURE: 97.6 F | BODY MASS INDEX: 18.36 KG/M2 | SYSTOLIC BLOOD PRESSURE: 127 MMHG | HEIGHT: 67 IN | WEIGHT: 117 LBS

## 2024-07-30 PROCEDURE — 99213 OFFICE O/P EST LOW 20 MIN: CPT

## 2024-07-30 NOTE — PHYSICAL EXAM
[General Appearance - Alert] : alert [General Appearance - In No Acute Distress] : in no acute distress [General Appearance - Well-Appearing] : healthy appearing [Sclera] : the sclera and conjunctiva were normal [PERRL With Normal Accommodation] : pupils were equal in size, round, reactive to light [Extraocular Movements] : extraocular movements were intact [Outer Ear] : the ears and nose were normal in appearance [Oropharynx] : the oropharynx was normal with no thrush [Neck Appearance] : the appearance of the neck was normal [Neck Cervical Mass (___cm)] : no neck mass was observed [Jugular Venous Distention Increased] : there was no jugular-venous distention [Thyroid Diffuse Enlargement] : the thyroid was not enlarged [] : no respiratory distress [Auscultation Breath Sounds / Voice Sounds] : lungs were clear to auscultation bilaterally [Heart Rate And Rhythm] : heart rate was normal and rhythm regular [Heart Sounds] : normal S1 and S2 [Heart Sounds Gallop] : no gallops [Murmurs] : no murmurs [Heart Sounds Pericardial Friction Rub] : no pericardial rub [Edema] : there was no peripheral edema [Bowel Sounds] : normal bowel sounds [Abdomen Soft] : soft [Cervical Lymph Nodes Enlarged Posterior Bilaterally] : posterior cervical [Cervical Lymph Nodes Enlarged Anterior Bilaterally] : anterior cervical [Supraclavicular Lymph Nodes Enlarged Bilaterally] : supraclavicular [Musculoskeletal - Swelling] : no joint swelling [Nail Clubbing] : no clubbing  or cyanosis of the fingernails [Motor Tone] : muscle strength and tone were normal [Skin Lesions] : no skin lesions [No Focal Deficits] : no focal deficits [Oriented To Time, Place, And Person] : oriented to person, place, and time [Affect] : the affect was normal [FreeTextEntry1] : RLL ama

## 2024-07-30 NOTE — ASSESSMENT
[FreeTextEntry1] :  73 F dx with bronchiectasis few years ago when she developed a cough. 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  She is reporting cough and that it sometimes limits her activities and what she does. This has waxed and waned over time.  She does met criteria for treatment with 2 positive cultures and abnormal imaging. I had given her literature to review and take home today. She is not opposed to treatment if needed for NTM but initially was hesitant due to potential side effects.  She started using the aerobika, chest vest and hypertonic saline nebs as well.  Has not been readily using the hypersal though.   I explained that treatment would require likely 3 medications and we reviewed the side effects. She was agreeable to azithromycin and ethambutol, but hesitant to start rifampin due to drug drug interactions which could decrease effectiveness of her insomnia medication.  Rec: 1) Continue mucus clearing devices - not able to use hypersal.  Only using albuterol and aerobika.  2) Respiratory Inhalers as per pulmonary 3) Continue azithromycin low dose 250 mg po MWF and Ethambutol 800 mg MWF.. Watch for GI upset, bone marrow suppression, hepatotoxicity, vision loss. Ishihara each visit to be done. Should have ophthal evaluation as well. Ishihara normal today.  On meds since 12/2022.   4) Avoid losing more weight 5) sensitivity on her isolate done and was sensitive to macrolide and amikacin. 6) Check CBC and CMP each visit. Check sputum each visit if able. Sputum first negative 2/2023. Hope to get more sputum prior to ending therapy.  She feels she has no side effects and is okay to stay on meds longer. Okay to continue for now. But will likely stop at next visit.   7) CT chest 3/2024 was stable. Not much improvement and not worse. 8) High dose flu vaccine given this season.  Spoke to pulmonary, Dr. Grace, about case as well.  RTC 2 months.

## 2024-07-30 NOTE — CONSULT LETTER
[Dear  ___] : Dear  [unfilled], [Consult Letter:] : I had the pleasure of evaluating your patient, [unfilled]. [Please see my note below.] : Please see my note below. [Consult Closing:] : Thank you very much for allowing me to participate in the care of this patient.  If you have any questions, please do not hesitate to contact me. [Sincerely,] : Sincerely, [FreeTextEntry2] : Dr. Guzman Spencermid [FreeTextEntry3] : \par  No Hernandez MD\par   of Medicine\par  Division of Infectious Diseases\par  [facility] [employer] [facility]\par  [address] [place] [address] DrBinu\[place] [address]\par  Tel: [phone]\par  Fax: [phone]

## 2024-07-30 NOTE — HISTORY OF PRESENT ILLNESS
[FreeTextEntry1] : 73 F dx with bronchiectasis few years ago when she developed a cough.  Presents today for M chimeara/intracellulare positive sputum culture.  Per first visit on 8/22/2022  Developed dry cough at that time. No cough at sleep.  No fevers/chills Some weight loss but tends to lose weight in the summer and adds weight back in the winter.  CT chest: 7/2022.Ground glass nodule in the left apex, 1.5 x 1.3 cm, overall stable compared to 2021.  Possibly increased compared to more remote studies. Persistent multifocal bronchiectasis in both lungs with mucoid impaction. A new ground glass density in the lateral RML tubular nodular denisty in the LLL was noted although other nodular densities seem to be smaller Most notably a nodule in the right apex was smaller on the current study.    Sputum from 6/20 had 2 cultures positive for M. chimera/intracellularie. 4/2022 was negative on AFB.   10/7/22: Sputum from last visit grew e coli. Treated with omnicef 300 mg po bid x 10 days with some improvement but cough returned.  AFB culture again grew M. chimera/intracellulare.  She started new inhaler though which is helping her and cough is better. She is hesitant to commit to antibiotics for NTM infection.  D/w pulmonary and has planned f/up ct in near future.   12/9/22: Would like to start treatment. PFTs worsened over time.  Using chest vest, hypertonic saline nebs now.   1/6/23: Started azithro 250 mg po MWF and Ethambutol 800 mg MWF 12/9/2022 Doing well. Tolerating meds. Had floater which resolved. Saw ophthalmology initially. No change in vision.  Not able to make sputum now. Cough is dry and she feels mucus is stuck.  Using chest vest, saline nebs, and aerobika also. Using ventolin if has to miss nebulizer treatment.   2/14/23: Doing well. Tolerating medications. Tired today b/c mom was sick.  Cough mostly dry, can bring up small amount of sputum at times.  Had to reschedule Ophthal evaluation b/c was dizzy the day of the appt.   4/28/2023: Doing well. Tolerating azithro/ethambutol. No change in vision. Gets floaters at times. Has nasal drip, back pain, and feels she is more hungry.  Also gets some upper abdominal discomfort with heavy coughing.   AFB culture from 2/24/2023 was w/o growth.  Labs have been stable. Ishihara has been normal.   6/30/20223: Reports some dizziness Needs to see eye doctor AFB negative 2/2023 and 5/2023. To get ct chest early july Does feel overall better since start of medication.  But still reports she doesn't feel well.   Has chronic back pain, when lies down start to cough.    9/22/2023: Doing okay. Feels dizzy at times. Has good and bad days with cough and mucus production. Taking meds. No change in vision.   AFB negative 2/2023 and 5/2023. Started meds 1/2023.  CT chest 7/2023 and due again 12/2023.  12/15/2023: Having some left upper back pain. Not sure if it is positional. Her mother passed away 11/17 at 89 years old.  She has been home more since this. No increased cough.  Today did not get to do her mucus clearing so has increased cough today. No fevers/chills. Can bring up some sputum at times.    2/23/24: Has some increased cough.  Uses vit C which helps her. She feels best in the the morning. Says she has cough but mucus not really coming up. Black pepper corns have triggered cough.  Likes to eat it on salmon.   No change in vision. Has CT chest 3/2024 and f/up pulmonary 4/2024.  Not able to bring back any sputum for me.    5/3/24: + cough. Not bringing up much phlegm. Cough is burdernsome.  CT chest: 3/6/24: Relatively stable. Scanned in. Unchanged bronchiectasis and mucus plugging.   Not using hypersal but will go back to trying it to help clear mucus.    7/30/24: Feels unwell today. Feels sad about her condition. Tearful at times. Coughs but is dry, cannot bring up sputum.  CT chest done 3/2024 was stable. Has not been able to give sputum sample since 5/2023. Doesn't feel well with hypersal so cannot use it.  Has been on meds for MAC since 12/2022.

## 2024-07-31 LAB
ALBUMIN SERPL ELPH-MCNC: 4.4 G/DL
ALP BLD-CCNC: 76 U/L
ALT SERPL-CCNC: 17 U/L
ANION GAP SERPL CALC-SCNC: 13 MMOL/L
AST SERPL-CCNC: 23 U/L
BASOPHILS # BLD AUTO: 0.03 K/UL
BASOPHILS NFR BLD AUTO: 0.4 %
BILIRUB SERPL-MCNC: 0.3 MG/DL
BUN SERPL-MCNC: 16 MG/DL
CALCIUM SERPL-MCNC: 9.1 MG/DL
CHLORIDE SERPL-SCNC: 101 MMOL/L
CO2 SERPL-SCNC: 23 MMOL/L
CREAT SERPL-MCNC: 0.66 MG/DL
EGFR: 92 ML/MIN/1.73M2
EOSINOPHIL # BLD AUTO: 0.07 K/UL
EOSINOPHIL NFR BLD AUTO: 0.9 %
GLUCOSE SERPL-MCNC: 83 MG/DL
HCT VFR BLD CALC: 43.3 %
HGB BLD-MCNC: 13.5 G/DL
IMM GRANULOCYTES NFR BLD AUTO: 0.4 %
LYMPHOCYTES # BLD AUTO: 1.17 K/UL
LYMPHOCYTES NFR BLD AUTO: 15.1 %
MAN DIFF?: NORMAL
MCHC RBC-ENTMCNC: 31.2 GM/DL
MCHC RBC-ENTMCNC: 31.2 PG
MCV RBC AUTO: 100 FL
MONOCYTES # BLD AUTO: 0.74 K/UL
MONOCYTES NFR BLD AUTO: 9.5 %
NEUTROPHILS # BLD AUTO: 5.73 K/UL
NEUTROPHILS NFR BLD AUTO: 73.7 %
PLATELET # BLD AUTO: 182 K/UL
POTASSIUM SERPL-SCNC: 4.7 MMOL/L
PROT SERPL-MCNC: 7 G/DL
RBC # BLD: 4.33 M/UL
RBC # FLD: 14.7 %
SODIUM SERPL-SCNC: 137 MMOL/L
WBC # FLD AUTO: 7.77 K/UL

## 2024-08-20 ENCOUNTER — APPOINTMENT (OUTPATIENT)
Dept: RADIOLOGY | Facility: CLINIC | Age: 74
End: 2024-08-20
Payer: MEDICARE

## 2024-08-20 PROCEDURE — 77085 DXA BONE DENSITY AXL VRT FX: CPT

## 2024-08-21 ENCOUNTER — NON-APPOINTMENT (OUTPATIENT)
Age: 74
End: 2024-08-21

## 2024-08-22 ENCOUNTER — APPOINTMENT (OUTPATIENT)
Dept: RHEUMATOLOGY | Facility: CLINIC | Age: 74
End: 2024-08-22

## 2024-08-22 VITALS
WEIGHT: 117 LBS | TEMPERATURE: 98.1 F | BODY MASS INDEX: 18.36 KG/M2 | HEART RATE: 80 BPM | OXYGEN SATURATION: 96 % | HEIGHT: 67 IN | DIASTOLIC BLOOD PRESSURE: 76 MMHG | SYSTOLIC BLOOD PRESSURE: 144 MMHG | RESPIRATION RATE: 16 BRPM

## 2024-08-22 DIAGNOSIS — R59.0 LOCALIZED ENLARGED LYMPH NODES: ICD-10-CM

## 2024-08-22 PROCEDURE — 99213 OFFICE O/P EST LOW 20 MIN: CPT

## 2024-08-22 PROCEDURE — G2211 COMPLEX E/M VISIT ADD ON: CPT

## 2024-09-11 ENCOUNTER — APPOINTMENT (OUTPATIENT)
Dept: RHEUMATOLOGY | Facility: CLINIC | Age: 74
End: 2024-09-11

## 2024-09-16 ENCOUNTER — MED ADMIN CHARGE (OUTPATIENT)
Age: 74
End: 2024-09-16

## 2024-09-16 ENCOUNTER — APPOINTMENT (OUTPATIENT)
Dept: RHEUMATOLOGY | Facility: CLINIC | Age: 74
End: 2024-09-16
Payer: MEDICARE

## 2024-09-16 VITALS
RESPIRATION RATE: 16 BRPM | SYSTOLIC BLOOD PRESSURE: 130 MMHG | HEART RATE: 80 BPM | TEMPERATURE: 97.2 F | DIASTOLIC BLOOD PRESSURE: 78 MMHG | OXYGEN SATURATION: 94 %

## 2024-09-16 PROCEDURE — 96372 THER/PROPH/DIAG INJ SC/IM: CPT

## 2024-09-16 RX ADMIN — DENOSUMAB 0 MG/ML: 60 INJECTION SUBCUTANEOUS at 00:00

## 2024-09-17 RX ORDER — DENOSUMAB 60 MG/ML
60 INJECTION SUBCUTANEOUS
Qty: 1 | Refills: 0 | Status: COMPLETED | OUTPATIENT
Start: 2024-09-16

## 2024-09-24 ENCOUNTER — APPOINTMENT (OUTPATIENT)
Dept: INFECTIOUS DISEASE | Facility: CLINIC | Age: 74
End: 2024-09-24
Payer: MEDICARE

## 2024-09-24 VITALS
HEART RATE: 83 BPM | HEIGHT: 67 IN | DIASTOLIC BLOOD PRESSURE: 85 MMHG | BODY MASS INDEX: 18.05 KG/M2 | TEMPERATURE: 98.2 F | WEIGHT: 115 LBS | SYSTOLIC BLOOD PRESSURE: 125 MMHG | OXYGEN SATURATION: 96 %

## 2024-09-24 PROCEDURE — 99213 OFFICE O/P EST LOW 20 MIN: CPT

## 2024-09-27 LAB
ALBUMIN SERPL ELPH-MCNC: 4.4 G/DL
ALP BLD-CCNC: 74 U/L
ALT SERPL-CCNC: 16 U/L
ANION GAP SERPL CALC-SCNC: 17 MMOL/L
AST SERPL-CCNC: 21 U/L
BASOPHILS # BLD AUTO: 0.02 K/UL
BASOPHILS NFR BLD AUTO: 0.3 %
BILIRUB SERPL-MCNC: 0.2 MG/DL
BUN SERPL-MCNC: 21 MG/DL
CALCIUM SERPL-MCNC: 9.9 MG/DL
CHLORIDE SERPL-SCNC: 98 MMOL/L
CO2 SERPL-SCNC: 23 MMOL/L
CREAT SERPL-MCNC: 0.74 MG/DL
EGFR: 85 ML/MIN/1.73M2
EOSINOPHIL # BLD AUTO: 0.04 K/UL
EOSINOPHIL NFR BLD AUTO: 0.6 %
GLUCOSE SERPL-MCNC: 93 MG/DL
HCT VFR BLD CALC: 42 %
HGB BLD-MCNC: 13.8 G/DL
IMM GRANULOCYTES NFR BLD AUTO: 0.3 %
LYMPHOCYTES # BLD AUTO: 1 K/UL
LYMPHOCYTES NFR BLD AUTO: 15.5 %
MAN DIFF?: NORMAL
MCHC RBC-ENTMCNC: 31.5 PG
MCHC RBC-ENTMCNC: 32.9 GM/DL
MCV RBC AUTO: 95.9 FL
MONOCYTES # BLD AUTO: 0.57 K/UL
MONOCYTES NFR BLD AUTO: 8.9 %
NEUTROPHILS # BLD AUTO: 4.79 K/UL
NEUTROPHILS NFR BLD AUTO: 74.4 %
PLATELET # BLD AUTO: 194 K/UL
POTASSIUM SERPL-SCNC: 4.8 MMOL/L
PROT SERPL-MCNC: 7 G/DL
RBC # BLD: 4.38 M/UL
RBC # FLD: 14.4 %
SODIUM SERPL-SCNC: 138 MMOL/L
WBC # FLD AUTO: 6.44 K/UL

## 2024-09-27 NOTE — HISTORY OF PRESENT ILLNESS
Your \"Bad\" cholesterol (LDL and/or triglycerides) are elevated. Please eat a healthier diet as described below. In particular avoid fried, fatty and junk foods, while increasing fiber (fruits and vegetables). If you cannot increase fiber through diet, you can supplement with metamucil as directed on bottle daily. Also, make sure you are taking 1 to 2 grams of over the counter fish oil. Increase exercise to 5 times per week of cardio lasting at least 30 min's each (biking, walking, elliptical, swimming). Lets recheck the fasting (atleast eight hours) in 6 months. Mediterranean diet: Choose this heart-healthy diet option  The Mediterranean diet is a heart-healthy eating plan combining elements of Mediterranean-style cooking. Here's how to adopt the Mediterranean diet. If you're looking for a heart-healthy eating plan, the Mediterranean diet might be right for you. The Mediterranean diet incorporates the basics of healthy eating - plus a splash of flavorful olive oil and perhaps a glass of red wine - among other components characterizing the traditional cooking style of countries bordering the [facility]. Most healthy diets include fruits, vegetables, fish and whole grains, and limit unhealthy fats. While these parts of a healthy diet remain tried-and-true, subtle variations or differences in proportions of certain foods may make a difference in your risk of heart disease. Benefits of the [address] has shown that the traditional Mediterranean diet reduces the risk of heart disease. In fact, a recent analysis of more than 1.5 million healthy adults demonstrated that following a Mediterranean diet was associated with a reduced risk of overall and cardiovascular mortality, a reduced incidence of cancer and cancer mortality, and a reduced incidence of Parkinson's and Alzheimer's diseases.    For this reason, most if not all major scientific organizations encourage healthy adults to adapt a style of eating like that of the [address] [facility] for prevention of major chronic diseases. Key components of the Mediterranean diet  The Mediterranean diet emphasizes:   Getting plenty of exercise   Eating primarily plant-based foods, such as fruits and vegetables, whole grains, legumes and nuts   Replacing butter with healthy fats such as olive oil and canola oil   Using herbs and spices instead of salt to flavor foods   Limiting red meat to no more than a few times a month   Eating fish and poultry at least twice a week   Drinking red wine in moderation (optional)   The diet also recognizes the importance of enjoying meals with family and friends. Fruits, vegetables, nuts and grains  The Mediterranean diet traditionally includes fruits, vegetables, pasta and rice. For example, residents of \[facility]\"" eat very little red meat and average nine servings a day of antioxidant-rich fruits and vegetables. The Mediterranean diet has been associated with a lower level of oxidized low-density lipoprotein (LDL) cholesterol - the \"bad\" cholesterol that's more likely to build up deposits in your arteries. Nuts are another part of a healthy Mediterranean diet. Nuts are high in fat (approximately 80 percent of their calories come from fat), but most of the fat is not saturated. Because nuts are high in calories, they should not be eaten in large amounts - generally no more than a handful a day. For the best nutrition, avoid candied or honey-roasted and heavily salted nuts. Grains in the [address] [place] [address] region are typically whole grain and usually contain very few unhealthy trans fats, and bread is an important part of the diet there. However, throughout the [address] [place] [address] region, bread is eaten plain or dipped in olive oil - not eaten with butter or margarines, which contain saturated or trans fats. [FreeTextEntry1] : 73 F dx with bronchiectasis few years ago when she developed a cough.  Presents today for M chimeara/intracellulare positive sputum culture.  Per first visit on 8/22/2022  Developed dry cough at that time. No cough at sleep.  No fevers/chills Some weight loss but tends to lose weight in the summer and adds weight back in the winter.  CT chest: 7/2022.Ground glass nodule in the left apex, 1.5 x 1.3 cm, overall stable compared to 2021.  Possibly increased compared to more remote studies. Persistent multifocal bronchiectasis in both lungs with mucoid impaction. A new ground glass density in the lateral RML tubular nodular denisty in the LLL was noted although other nodular densities seem to be smaller Most notably a nodule in the right apex was smaller on the current study.    Sputum from 6/20 had 2 cultures positive for M. chimera/intracellularie. 4/2022 was negative on AFB.   10/7/22: Sputum from last visit grew e coli. Treated with omnicef 300 mg po bid x 10 days with some improvement but cough returned.  AFB culture again grew M. chimera/intracellulare.  She started new inhaler though which is helping her and cough is better. She is hesitant to commit to antibiotics for NTM infection.  D/w pulmonary and has planned f/up ct in near future.   12/9/22: Would like to start treatment. PFTs worsened over time.  Using chest vest, hypertonic saline nebs now.   1/6/23: Started azithro 250 mg po MWF and Ethambutol 800 mg MWF 12/9/2022 Doing well. Tolerating meds. Had floater which resolved. Saw ophthalmology initially. No change in vision.  Not able to make sputum now. Cough is dry and she feels mucus is stuck.  Using chest vest, saline nebs, and aerobika also. Using ventolin if has to miss nebulizer treatment.   2/14/23: Doing well. Tolerating medications. Tired today b/c mom was sick.  Cough mostly dry, can bring up small amount of sputum at times.  Had to reschedule Ophthal evaluation b/c was dizzy the day of the appt.   4/28/2023: Doing well. Tolerating azithro/ethambutol. No change in vision. Gets floaters at times. Has nasal drip, back pain, and feels she is more hungry.  Also gets some upper abdominal discomfort with heavy coughing.   AFB culture from 2/24/2023 was w/o growth.  Labs have been stable. Ishihara has been normal.   6/30/20223: Reports some dizziness Needs to see eye doctor AFB negative 2/2023 and 5/2023. To get ct chest early july Does feel overall better since start of medication.  But still reports she doesn't feel well.   Has chronic back pain, when lies down start to cough.    9/22/2023: Doing okay. Feels dizzy at times. Has good and bad days with cough and mucus production. Taking meds. No change in vision.   AFB negative 2/2023 and 5/2023. Started meds 1/2023.  CT chest 7/2023 and due again 12/2023.  12/15/2023: Having some left upper back pain. Not sure if it is positional. Her mother passed away 11/17 at 89 years old.  She has been home more since this. No increased cough.  Today did not get to do her mucus clearing so has increased cough today. No fevers/chills. Can bring up some sputum at times.    2/23/24: Has some increased cough.  Uses vit C which helps her. She feels best in the the morning. Says she has cough but mucus not really coming up. Black pepper corns have triggered cough.  Likes to eat it on salmon.   No change in vision. Has CT chest 3/2024 and f/up pulmonary 4/2024.  Not able to bring back any sputum for me.    5/3/24: + cough. Not bringing up much phlegm. Cough is burdernsome.  CT chest: 3/6/24: Relatively stable. Scanned in. Unchanged bronchiectasis and mucus plugging.   Not using hypersal but will go back to trying it to help clear mucus.    7/30/24: Feels unwell today. Feels sad about her condition. Tearful at times. Coughs but is dry, cannot bring up sputum.  CT chest done 3/2024 was stable. Has not been able to give sputum sample since 5/2023. Doesn't feel well with hypersal so cannot use it.  Has been on meds for MAC since 12/2022.  9/24/24: Not feeling well today. Gave me sputum today To get ct chest 11/2024 as per pt Has been on meds for MAC 2 drugs only since 12/2022 Was not able to get sputum until today.

## 2024-09-27 NOTE — ASSESSMENT
[FreeTextEntry1] :  73 F dx with bronchiectasis few years ago when she developed a cough. 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  She is reporting cough and that it sometimes limits her activities and what she does. This has waxed and waned over time.  She does met criteria for treatment with 2 positive cultures and abnormal imaging. I had given her literature to review and take home today. She is not opposed to treatment if needed for NTM but initially was hesitant due to potential side effects.  She started using the aerobika, chest vest and hypertonic saline nebs as well.  Has not been readily using the hypersal though as she cannot tolerate it.   I explained that treatment would require likely 3 medications and we reviewed the side effects. She was agreeable to azithromycin and ethambutol, but hesitant to start rifampin due to drug-drug interactions which could decrease effectiveness of her insomnia medication.  Rec: 1) Continue mucus clearing devices - not able to use hypersal.  Only using albuterol and aerobika.  2) Respiratory Inhalers as per pulmonary 3) Continue azithromycin low dose 250 mg po MWF and Ethambutol 800 mg MWF.. Watch for GI upset, bone marrow suppression, hepatotoxicity, vision loss. Ishihara each visit to be done. Should have ophthal evaluation as well. Ishihara normal today.  On meds since 12/2022.   4) Avoid losing more weight 5) sensitivity on her isolate done and was sensitive to macrolide and amikacin. 6) Check CBC and CMP each visit. Check sputum today - gave sample. Sputum first negative 2/2023. Hope to get more sputum prior to ending therapy.  She feels she has no side effects and is okay to stay on meds longer. Okay to continue for now. But will likely stop at next visit.   7) CT chest 3/2024 was stable. Not much improvement and not worse.  Will f/up next ct 11/2024 and then hope to stop CHARLETTE meds.  8) High dose flu vaccine given this season.   RTC 2 months.

## 2024-09-27 NOTE — CONSULT LETTER
[Dear  ___] : Dear  [unfilled], [Consult Letter:] : I had the pleasure of evaluating your patient, [unfilled]. [Please see my note below.] : Please see my note below. [Consult Closing:] : Thank you very much for allowing me to participate in the care of this patient.  If you have any questions, please do not hesitate to contact me. [Sincerely,] : Sincerely, [FreeTextEntry2] : Dr. Guzman Spencermid [FreeTextEntry3] : \par  No Hernandez MD\par   of Medicine\par  Division of Infectious Diseases\par  [facility] [employer] [facility] at [facility]\par  [address] [place] [address] DrBinu\[place] [address]\par  Tel: [phone]\par  Fax: [phone]

## 2024-09-27 NOTE — CONSULT LETTER
[Dear  ___] : Dear  [unfilled], [Consult Letter:] : I had the pleasure of evaluating your patient, [unfilled]. [Please see my note below.] : Please see my note below. [Consult Closing:] : Thank you very much for allowing me to participate in the care of this patient.  If you have any questions, please do not hesitate to contact me. [Sincerely,] : Sincerely, [FreeTextEntry2] : Dr. Guzman Spencermid [FreeTextEntry3] : \par  No Hernandez MD\par   of Medicine\par  Division of Infectious Diseases\par  [facility] [employer] [facility] at [facility]\par  [address] [place] [address] DrBinu\[place] [address]\par  Tel: [phone]\par  Fax: [phone]

## 2024-09-27 NOTE — HISTORY OF PRESENT ILLNESS
[FreeTextEntry1] : 73 F dx with bronchiectasis few years ago when she developed a cough.  Presents today for M chimeara/intracellulare positive sputum culture.  Per first visit on 8/22/2022  Developed dry cough at that time. No cough at sleep.  No fevers/chills Some weight loss but tends to lose weight in the summer and adds weight back in the winter.  CT chest: 7/2022.Ground glass nodule in the left apex, 1.5 x 1.3 cm, overall stable compared to 2021.  Possibly increased compared to more remote studies. Persistent multifocal bronchiectasis in both lungs with mucoid impaction. A new ground glass density in the lateral RML tubular nodular denisty in the LLL was noted although other nodular densities seem to be smaller Most notably a nodule in the right apex was smaller on the current study.    Sputum from 6/20 had 2 cultures positive for M. chimera/intracellularie. 4/2022 was negative on AFB.   10/7/22: Sputum from last visit grew e coli. Treated with omnicef 300 mg po bid x 10 days with some improvement but cough returned.  AFB culture again grew M. chimera/intracellulare.  She started new inhaler though which is helping her and cough is better. She is hesitant to commit to antibiotics for NTM infection.  D/w pulmonary and has planned f/up ct in near future.   12/9/22: Would like to start treatment. PFTs worsened over time.  Using chest vest, hypertonic saline nebs now.   1/6/23: Started azithro 250 mg po MWF and Ethambutol 800 mg MWF 12/9/2022 Doing well. Tolerating meds. Had floater which resolved. Saw ophthalmology initially. No change in vision.  Not able to make sputum now. Cough is dry and she feels mucus is stuck.  Using chest vest, saline nebs, and aerobika also. Using ventolin if has to miss nebulizer treatment.   2/14/23: Doing well. Tolerating medications. Tired today b/c mom was sick.  Cough mostly dry, can bring up small amount of sputum at times.  Had to reschedule Ophthal evaluation b/c was dizzy the day of the appt.   4/28/2023: Doing well. Tolerating azithro/ethambutol. No change in vision. Gets floaters at times. Has nasal drip, back pain, and feels she is more hungry.  Also gets some upper abdominal discomfort with heavy coughing.   AFB culture from 2/24/2023 was w/o growth.  Labs have been stable. [place] has been normal.   6/30/20223: Reports some dizziness Needs to see eye doctor AFB negative 2/2023 and 5/2023. To get ct chest early july Does feel overall better since start of medication.  But still reports she doesn't feel well.   Has chronic back pain, when lies down start to cough.    9/22/2023: Doing okay. Feels dizzy at times. Has good and bad days with cough and mucus production. Taking meds. No change in vision.   AFB negative 2/2023 and 5/2023. Started meds 1/2023.  CT chest 7/2023 and due again 12/2023.  12/15/2023: Having some left upper back pain. Not sure if it is positional. Her mother passed away 11/17 at 107 years old.  She has been home more since this. No increased cough.  Today did not get to do her mucus clearing so has increased cough today. No fevers/chills. Can bring up some sputum at times.    2/23/24: Has some increased cough.  Uses vit C which helps her. She feels best in the the morning. Says she has cough but mucus not really coming up. Black pepper corns have triggered cough.  Likes to eat it on salmon.   No change in vision. Has CT chest 3/2024 and f/up pulmonary 4/2024.  Not able to bring back any sputum for me.    5/3/24: + cough. Not bringing up much phlegm. Cough is burdernsome.  CT chest: 3/6/24: Relatively stable. Scanned in. Unchanged bronchiectasis and mucus plugging.   Not using hypersal but will go back to trying it to help clear mucus.    7/30/24: Feels unwell today. Feels sad about her condition. Tearful at times. Coughs but is dry, cannot bring up sputum.  CT chest done 3/2024 was stable. Has not been able to give sputum sample since 5/2023. Doesn't feel well with hypersal so cannot use it.  Has been on meds for MAC since 12/2022.  9/24/24: Not feeling well today. Gave me sputum today To get ct chest 11/2024 as per pt Has been on meds for MAC 2 drugs only since 12/2022 Was not able to get sputum until today.

## 2024-10-01 LAB — BACTERIA SPT CULT: ABNORMAL

## 2024-10-01 RX ORDER — CEFDINIR 300 MG/1
300 CAPSULE ORAL
Qty: 20 | Refills: 0 | Status: ACTIVE | COMMUNITY
Start: 2024-10-01 | End: 1900-01-01

## 2024-10-04 LAB
ACID FAST STN SPT: NORMAL
FUNGUS SPT CULT: NORMAL

## 2024-10-11 ENCOUNTER — NON-APPOINTMENT (OUTPATIENT)
Age: 74
End: 2024-10-11

## 2024-11-19 ENCOUNTER — APPOINTMENT (OUTPATIENT)
Dept: INFECTIOUS DISEASE | Facility: CLINIC | Age: 74
End: 2024-11-19
Payer: MEDICARE

## 2024-11-19 VITALS
BODY MASS INDEX: 18.83 KG/M2 | WEIGHT: 120 LBS | TEMPERATURE: 97.8 F | HEART RATE: 76 BPM | DIASTOLIC BLOOD PRESSURE: 80 MMHG | SYSTOLIC BLOOD PRESSURE: 137 MMHG | OXYGEN SATURATION: 95 % | HEIGHT: 67 IN

## 2024-11-19 PROCEDURE — 99214 OFFICE O/P EST MOD 30 MIN: CPT

## 2024-11-25 LAB
ALBUMIN SERPL ELPH-MCNC: 4.6 G/DL
ALP BLD-CCNC: 73 U/L
ALT SERPL-CCNC: 18 U/L
ANION GAP SERPL CALC-SCNC: 14 MMOL/L
AST SERPL-CCNC: 18 U/L
BASOPHILS # BLD AUTO: 0.04 K/UL
BASOPHILS NFR BLD AUTO: 0.6 %
BILIRUB SERPL-MCNC: 0.3 MG/DL
BUN SERPL-MCNC: 18 MG/DL
CALCIUM SERPL-MCNC: 9.9 MG/DL
CHLORIDE SERPL-SCNC: 100 MMOL/L
CO2 SERPL-SCNC: 25 MMOL/L
CREAT SERPL-MCNC: 0.78 MG/DL
EGFR: 79 ML/MIN/1.73M2
EOSINOPHIL # BLD AUTO: 0.06 K/UL
EOSINOPHIL NFR BLD AUTO: 0.8 %
GLUCOSE SERPL-MCNC: 95 MG/DL
HCT VFR BLD CALC: 41.4 %
HGB BLD-MCNC: 13.6 G/DL
IMM GRANULOCYTES NFR BLD AUTO: 0.1 %
LYMPHOCYTES # BLD AUTO: 1.04 K/UL
LYMPHOCYTES NFR BLD AUTO: 14.3 %
MAN DIFF?: NORMAL
MCHC RBC-ENTMCNC: 31.1 PG
MCHC RBC-ENTMCNC: 32.9 G/DL
MCV RBC AUTO: 94.5 FL
MONOCYTES # BLD AUTO: 0.71 K/UL
MONOCYTES NFR BLD AUTO: 9.8 %
NEUTROPHILS # BLD AUTO: 5.41 K/UL
NEUTROPHILS NFR BLD AUTO: 74.4 %
PLATELET # BLD AUTO: 195 K/UL
POTASSIUM SERPL-SCNC: 5.7 MMOL/L
PROT SERPL-MCNC: 7.1 G/DL
RBC # BLD: 4.38 M/UL
RBC # FLD: 13.9 %
SODIUM SERPL-SCNC: 139 MMOL/L
WBC # FLD AUTO: 7.27 K/UL

## 2025-01-17 DIAGNOSIS — R22.0 LOCALIZED SWELLING, MASS AND LUMP, HEAD: ICD-10-CM

## 2025-01-17 DIAGNOSIS — M27.8 OTHER SPECIFIED DISEASES OF JAWS: ICD-10-CM

## 2025-01-21 ENCOUNTER — APPOINTMENT (OUTPATIENT)
Dept: INFECTIOUS DISEASE | Facility: CLINIC | Age: 75
End: 2025-01-21
Payer: MEDICARE

## 2025-01-21 VITALS
WEIGHT: 113 LBS | BODY MASS INDEX: 17.74 KG/M2 | SYSTOLIC BLOOD PRESSURE: 150 MMHG | OXYGEN SATURATION: 93 % | HEIGHT: 67 IN | DIASTOLIC BLOOD PRESSURE: 78 MMHG | TEMPERATURE: 97.7 F | HEART RATE: 67 BPM

## 2025-01-21 DIAGNOSIS — D10.30 BENIGN NEOPLASM OF UNSPECIFIED PART OF MOUTH: ICD-10-CM

## 2025-01-21 PROCEDURE — 99213 OFFICE O/P EST LOW 20 MIN: CPT

## 2025-02-27 ENCOUNTER — APPOINTMENT (OUTPATIENT)
Dept: RHEUMATOLOGY | Facility: CLINIC | Age: 75
End: 2025-02-27
Payer: MEDICARE

## 2025-02-27 ENCOUNTER — NON-APPOINTMENT (OUTPATIENT)
Age: 75
End: 2025-02-27

## 2025-02-27 VITALS
HEART RATE: 96 BPM | RESPIRATION RATE: 14 BRPM | HEIGHT: 67 IN | OXYGEN SATURATION: 93 % | SYSTOLIC BLOOD PRESSURE: 155 MMHG | DIASTOLIC BLOOD PRESSURE: 74 MMHG | BODY MASS INDEX: 17.74 KG/M2 | WEIGHT: 113 LBS

## 2025-02-27 DIAGNOSIS — M81.0 AGE-RELATED OSTEOPOROSIS W/OUT CURRENT PATHOLOGICAL FRACTURE: ICD-10-CM

## 2025-02-27 PROCEDURE — 99214 OFFICE O/P EST MOD 30 MIN: CPT

## 2025-03-07 NOTE — PROGRESS NOTE ADULT - ASSESSMENT
Asked to comment on L parietal hypodensity on CT. Exam normal doubt cerebrovascular event. Agree with MRI brain to eval. No objection to plavix and statin. If no stroke found and no medical indication may hold plavix and statin. last water 10 pm/clears

## 2025-03-17 ENCOUNTER — APPOINTMENT (OUTPATIENT)
Dept: RHEUMATOLOGY | Facility: CLINIC | Age: 75
End: 2025-03-17
Payer: MEDICARE

## 2025-03-17 VITALS
SYSTOLIC BLOOD PRESSURE: 132 MMHG | BODY MASS INDEX: 17.85 KG/M2 | WEIGHT: 114 LBS | OXYGEN SATURATION: 97 % | HEART RATE: 81 BPM | DIASTOLIC BLOOD PRESSURE: 75 MMHG

## 2025-03-17 DIAGNOSIS — M81.0 AGE-RELATED OSTEOPOROSIS W/OUT CURRENT PATHOLOGICAL FRACTURE: ICD-10-CM

## 2025-03-17 PROCEDURE — 99212 OFFICE O/P EST SF 10 MIN: CPT | Mod: 25

## 2025-03-17 PROCEDURE — 96372 THER/PROPH/DIAG INJ SC/IM: CPT

## 2025-03-18 ENCOUNTER — APPOINTMENT (OUTPATIENT)
Dept: RHEUMATOLOGY | Facility: CLINIC | Age: 75
End: 2025-03-18

## 2025-03-18 RX ADMIN — DENOSUMAB 0 MG/ML: 60 INJECTION SUBCUTANEOUS at 00:00

## 2025-03-19 RX ORDER — DENOSUMAB 60 MG/ML
60 INJECTION SUBCUTANEOUS
Qty: 1 | Refills: 0 | Status: COMPLETED | OUTPATIENT
Start: 2025-03-18

## 2025-03-28 LAB
BACTERIA SPT CULT: NORMAL
BACTERIA SPT CULT: NORMAL

## 2025-04-04 LAB — FUNGUS SPT CULT: ABNORMAL

## 2025-04-05 LAB
ACID FAST STN SPT: NORMAL
ACID FAST STN SPT: NORMAL
FUNGUS SPT CULT: NORMAL

## 2025-04-09 ENCOUNTER — APPOINTMENT (OUTPATIENT)
Age: 75
End: 2025-04-09

## 2025-04-09 PROCEDURE — 99213 OFFICE O/P EST LOW 20 MIN: CPT

## 2025-04-22 ENCOUNTER — APPOINTMENT (OUTPATIENT)
Dept: INFECTIOUS DISEASE | Facility: CLINIC | Age: 75
End: 2025-04-22
Payer: MEDICARE

## 2025-04-22 VITALS
BODY MASS INDEX: 17.89 KG/M2 | OXYGEN SATURATION: 95 % | SYSTOLIC BLOOD PRESSURE: 159 MMHG | TEMPERATURE: 98.6 F | DIASTOLIC BLOOD PRESSURE: 85 MMHG | HEART RATE: 75 BPM | HEIGHT: 67 IN | WEIGHT: 114 LBS

## 2025-04-22 PROCEDURE — 99213 OFFICE O/P EST LOW 20 MIN: CPT

## 2025-04-24 ENCOUNTER — APPOINTMENT (OUTPATIENT)
Age: 75
End: 2025-04-24
Payer: MEDICARE

## 2025-04-24 VITALS
DIASTOLIC BLOOD PRESSURE: 76 MMHG | WEIGHT: 114 LBS | OXYGEN SATURATION: 96 % | HEIGHT: 67 IN | RESPIRATION RATE: 15 BRPM | HEART RATE: 94 BPM | SYSTOLIC BLOOD PRESSURE: 125 MMHG | BODY MASS INDEX: 17.89 KG/M2

## 2025-04-24 DIAGNOSIS — M54.16 RADICULOPATHY, LUMBAR REGION: ICD-10-CM

## 2025-04-24 DIAGNOSIS — M81.0 AGE-RELATED OSTEOPOROSIS W/OUT CURRENT PATHOLOGICAL FRACTURE: ICD-10-CM

## 2025-04-24 DIAGNOSIS — M41.9 SCOLIOSIS, UNSPECIFIED: ICD-10-CM

## 2025-04-24 DIAGNOSIS — A31.0 PULMONARY MYCOBACTERIAL INFECTION: ICD-10-CM

## 2025-04-24 DIAGNOSIS — J47.9 BRONCHIECTASIS, UNCOMPLICATED: ICD-10-CM

## 2025-04-24 DIAGNOSIS — J31.0 CHRONIC RHINITIS: ICD-10-CM

## 2025-04-24 PROCEDURE — 99215 OFFICE O/P EST HI 40 MIN: CPT

## 2025-04-24 RX ORDER — FLUTICASONE PROPIONATE 50 UG/1
50 SPRAY, METERED NASAL DAILY
Qty: 1 | Refills: 4 | Status: ACTIVE | COMMUNITY
Start: 2025-04-24 | End: 1900-01-01

## 2025-04-24 RX ORDER — SODIUM CHLORIDE FOR INHALATION 3 %
3 VIAL, NEBULIZER (ML) INHALATION
Qty: 1 | Refills: 0 | Status: ACTIVE | COMMUNITY
Start: 2025-04-24 | End: 1900-01-01

## 2025-04-25 PROBLEM — J47.9 BRONCHIECTASIS WITHOUT COMPLICATION: Status: ACTIVE | Noted: 2025-04-25

## 2025-04-28 ENCOUNTER — NON-APPOINTMENT (OUTPATIENT)
Age: 75
End: 2025-04-28

## 2025-06-06 ENCOUNTER — APPOINTMENT (OUTPATIENT)
Dept: INFECTIOUS DISEASE | Facility: CLINIC | Age: 75
End: 2025-06-06
Payer: MEDICARE

## 2025-06-06 VITALS
WEIGHT: 116 LBS | HEIGHT: 67 IN | HEART RATE: 80 BPM | DIASTOLIC BLOOD PRESSURE: 83 MMHG | SYSTOLIC BLOOD PRESSURE: 149 MMHG | TEMPERATURE: 99 F | OXYGEN SATURATION: 93 % | BODY MASS INDEX: 18.21 KG/M2

## 2025-06-06 PROCEDURE — G2211 COMPLEX E/M VISIT ADD ON: CPT

## 2025-06-06 PROCEDURE — 99213 OFFICE O/P EST LOW 20 MIN: CPT

## 2025-06-06 RX ORDER — LORAZEPAM 1 MG/1
1 TABLET ORAL
Refills: 0 | Status: ACTIVE | COMMUNITY

## 2025-06-06 RX ORDER — ZOLPIDEM TARTRATE 5 MG/1
5 TABLET, FILM COATED ORAL
Refills: 0 | Status: ACTIVE | COMMUNITY

## 2025-06-06 RX ORDER — MINOXIDIL 10 MG/1
10 TABLET ORAL
Refills: 0 | Status: ACTIVE | COMMUNITY

## 2025-06-06 RX ORDER — MELATONIN 1.5 MG
1.5 TABLET ORAL
Refills: 0 | Status: ACTIVE | COMMUNITY

## 2025-06-09 LAB
ALBUMIN SERPL ELPH-MCNC: 4.4 G/DL
ALP BLD-CCNC: 98 U/L
ALT SERPL-CCNC: 33 U/L
ANION GAP SERPL CALC-SCNC: 14 MMOL/L
AST SERPL-CCNC: 25 U/L
BASOPHILS # BLD AUTO: 0.03 K/UL
BASOPHILS NFR BLD AUTO: 0.4 %
BILIRUB SERPL-MCNC: 0.2 MG/DL
BUN SERPL-MCNC: 15 MG/DL
CALCIUM SERPL-MCNC: 9.1 MG/DL
CHLORIDE SERPL-SCNC: 101 MMOL/L
CO2 SERPL-SCNC: 24 MMOL/L
CREAT SERPL-MCNC: 0.78 MG/DL
EGFRCR SERPLBLD CKD-EPI 2021: 79 ML/MIN/1.73M2
EOSINOPHIL # BLD AUTO: 0.04 K/UL
EOSINOPHIL NFR BLD AUTO: 0.6 %
GLUCOSE SERPL-MCNC: 99 MG/DL
HCT VFR BLD CALC: 41.4 %
HGB BLD-MCNC: 13.1 G/DL
IMM GRANULOCYTES NFR BLD AUTO: 0.3 %
LYMPHOCYTES # BLD AUTO: 0.74 K/UL
LYMPHOCYTES NFR BLD AUTO: 11 %
MAN DIFF?: NORMAL
MCHC RBC-ENTMCNC: 29.9 PG
MCHC RBC-ENTMCNC: 31.6 G/DL
MCV RBC AUTO: 94.5 FL
MONOCYTES # BLD AUTO: 0.61 K/UL
MONOCYTES NFR BLD AUTO: 9 %
NEUTROPHILS # BLD AUTO: 5.31 K/UL
NEUTROPHILS NFR BLD AUTO: 78.7 %
PLATELET # BLD AUTO: 190 K/UL
POTASSIUM SERPL-SCNC: 4.4 MMOL/L
PROT SERPL-MCNC: 6.9 G/DL
RBC # BLD: 4.38 M/UL
RBC # FLD: 14.4 %
SODIUM SERPL-SCNC: 138 MMOL/L
WBC # FLD AUTO: 6.75 K/UL

## 2025-06-13 ENCOUNTER — RESULT REVIEW (OUTPATIENT)
Age: 75
End: 2025-06-13

## 2025-06-24 ENCOUNTER — APPOINTMENT (OUTPATIENT)
Dept: PULMONOLOGY | Facility: CLINIC | Age: 75
End: 2025-06-24
Payer: MEDICARE

## 2025-06-24 VITALS
RESPIRATION RATE: 16 BRPM | TEMPERATURE: 98.4 F | DIASTOLIC BLOOD PRESSURE: 66 MMHG | HEART RATE: 91 BPM | SYSTOLIC BLOOD PRESSURE: 110 MMHG | OXYGEN SATURATION: 90 %

## 2025-06-24 PROBLEM — J18.9 PNEUMONIA OF RIGHT LUNG DUE TO INFECTIOUS ORGANISM, UNSPECIFIED PART OF LUNG: Status: ACTIVE | Noted: 2025-06-24

## 2025-06-24 PROCEDURE — 99215 OFFICE O/P EST HI 40 MIN: CPT

## 2025-06-24 RX ORDER — LEVOFLOXACIN 750 MG/1
750 TABLET, FILM COATED ORAL DAILY
Qty: 7 | Refills: 0 | Status: ACTIVE | COMMUNITY
Start: 2025-06-24 | End: 1900-01-01

## 2025-06-26 RX ORDER — SODIUM CHLORIDE SOLN NEBU 7% 7 %
7 NEBU SOLN INHALATION
Qty: 30 | Refills: 0 | Status: ACTIVE | COMMUNITY
Start: 2025-06-24 | End: 1900-01-01

## 2025-07-01 ENCOUNTER — APPOINTMENT (OUTPATIENT)
Age: 75
End: 2025-07-01
Payer: MEDICARE

## 2025-07-01 ENCOUNTER — LABORATORY RESULT (OUTPATIENT)
Age: 75
End: 2025-07-01

## 2025-07-01 PROCEDURE — 36415 COLL VENOUS BLD VENIPUNCTURE: CPT

## 2025-07-02 ENCOUNTER — NON-APPOINTMENT (OUTPATIENT)
Age: 75
End: 2025-07-02

## 2025-07-02 LAB
CRP SERPL-MCNC: 4 MG/L
DEPRECATED KAPPA LC FREE/LAMBDA SER: 1.52 RATIO
IGA SERPL-MCNC: 187 MG/DL
IGG SERPL-MCNC: 924 MG/DL
IGG SERPL-MCNC: 924 MG/DL
IGG1 SER-MCNC: 423 MG/DL
IGG2 SER-MCNC: 371 MG/DL
IGG3 SER-MCNC: 34.6 MG/DL
IGG4 SER-MCNC: 37.4 MG/DL
IGM SERPL-MCNC: 72 MG/DL
KAPPA LC CSF-MCNC: 1.09 MG/DL
KAPPA LC SERPL-MCNC: 1.66 MG/DL
RHEUMATOID FACT SERPL-ACNC: 10 IU/ML

## 2025-07-03 ENCOUNTER — APPOINTMENT (OUTPATIENT)
Age: 75
End: 2025-07-03
Payer: MEDICARE

## 2025-07-03 LAB
A ALTERNATA IGE QN: <0.1 KUA/L
A FUMIGATUS IGE QN: <0.1 KUA/L
ANA TITR SER: NEGATIVE
C ALBICANS IGE QN: <0.1 KUA/L
C HERBARUM IGE QN: <0.1 KUA/L
CAT DANDER IGE QN: <0.1 KUA/L
COMMON RAGWEED IGE QN: <0.1 KUA/L
D FARINAE IGE QN: <0.1 KUA/L
D PTERONYSS IGE QN: <0.1 KUA/L
DEPRECATED A ALTERNATA IGE RAST QL: 0
DEPRECATED A FUMIGATUS IGE RAST QL: 0
DEPRECATED C ALBICANS IGE RAST QL: 0
DEPRECATED C HERBARUM IGE RAST QL: 0
DEPRECATED CAT DANDER IGE RAST QL: 0
DEPRECATED COMMON RAGWEED IGE RAST QL: 0
DEPRECATED D FARINAE IGE RAST QL: 0
DEPRECATED D PTERONYSS IGE RAST QL: 0
DEPRECATED DOG DANDER IGE RAST QL: 0
DEPRECATED M RACEMOSUS IGE RAST QL: 0
DEPRECATED ROACH IGE RAST QL: 0
DEPRECATED TIMOTHY IGE RAST QL: 0
DEPRECATED WHITE OAK IGE RAST QL: 0
DOG DANDER IGE QN: <0.1 KUA/L
M RACEMOSUS IGE QN: <0.1 KUA/L
ROACH IGE QN: <0.1 KUA/L
TIMOTHY IGE QN: <0.1 KUA/L
TOTAL IGE SMQN RAST: 2 KU/L
WHITE OAK IGE QN: <0.1 KUA/L

## 2025-07-03 PROCEDURE — 99024 POSTOP FOLLOW-UP VISIT: CPT

## 2025-07-06 LAB
A FLAVUS AB FLD QL: NEGATIVE
A FUMIGATUS AB FLD QL: NEGATIVE
A NIGER AB FLD QL: NEGATIVE

## 2025-07-16 ENCOUNTER — NON-APPOINTMENT (OUTPATIENT)
Age: 75
End: 2025-07-16

## 2025-07-16 PROBLEM — J47.1 BRONCHIECTASIS WITH ACUTE EXACERBATION: Status: ACTIVE | Noted: 2025-07-16

## 2025-07-16 RX ORDER — LEVOFLOXACIN 750 MG/1
750 TABLET, FILM COATED ORAL DAILY
Qty: 14 | Refills: 0 | Status: ACTIVE | COMMUNITY
Start: 2025-07-16 | End: 1900-01-01

## 2025-07-17 ENCOUNTER — APPOINTMENT (OUTPATIENT)
Age: 75
End: 2025-07-17

## 2025-07-17 LAB
BASOPHILS # BLD AUTO: 0.02 K/UL
BASOPHILS NFR BLD AUTO: 0.4 %
EOSINOPHIL # BLD AUTO: 0.07 K/UL
EOSINOPHIL NFR BLD AUTO: 1.4 %
HCT VFR BLD CALC: 39.5 %
HGB BLD-MCNC: 12.8 G/DL
IMM GRANULOCYTES NFR BLD AUTO: 0.2 %
LYMPHOCYTES # BLD AUTO: 0.76 K/UL
LYMPHOCYTES NFR BLD AUTO: 14.8 %
MAN DIFF?: NORMAL
MCHC RBC-ENTMCNC: 30 PG
MCHC RBC-ENTMCNC: 32.4 G/DL
MCV RBC AUTO: 92.7 FL
MONOCYTES # BLD AUTO: 0.45 K/UL
MONOCYTES NFR BLD AUTO: 8.8 %
NEUTROPHILS # BLD AUTO: 3.83 K/UL
NEUTROPHILS NFR BLD AUTO: 74.4 %
PLATELET # BLD AUTO: 190 K/UL
RBC # BLD: 4.26 M/UL
RBC # FLD: 14.4 %
WBC # FLD AUTO: 5.14 K/UL

## 2025-07-17 PROCEDURE — 36415 COLL VENOUS BLD VENIPUNCTURE: CPT

## 2025-07-18 LAB
ALBUMIN SERPL ELPH-MCNC: 4.4 G/DL
ALP BLD-CCNC: 85 U/L
ALT SERPL-CCNC: 33 U/L
ANION GAP SERPL CALC-SCNC: 14 MMOL/L
AST SERPL-CCNC: 32 U/L
BILIRUB SERPL-MCNC: 0.4 MG/DL
BUN SERPL-MCNC: 15 MG/DL
CALCIUM SERPL-MCNC: 9.8 MG/DL
CHLORIDE SERPL-SCNC: 99 MMOL/L
CO2 SERPL-SCNC: 22 MMOL/L
CREAT SERPL-MCNC: 0.72 MG/DL
EGFRCR SERPLBLD CKD-EPI 2021: 87 ML/MIN/1.73M2
GLUCOSE SERPL-MCNC: 93 MG/DL
POTASSIUM SERPL-SCNC: 4.8 MMOL/L
PROT SERPL-MCNC: 6.8 G/DL
SODIUM SERPL-SCNC: 135 MMOL/L

## 2025-07-19 LAB — FUNGITELL QUANTITATIVE VALUE: <31 PG/ML

## 2025-07-21 LAB
BACTERIA SPT CF RESP CULT: NORMAL
FUNGUS SPT CULT: ABNORMAL
GALACTOMANNAN AG SERPL QL IA: 0.02 INDEX

## 2025-07-25 LAB — ACID FAST STN SPT: ABNORMAL

## 2025-08-01 ENCOUNTER — RESULT REVIEW (OUTPATIENT)
Age: 75
End: 2025-08-01

## 2025-08-01 ENCOUNTER — APPOINTMENT (OUTPATIENT)
Dept: HEMATOLOGY ONCOLOGY | Facility: CLINIC | Age: 75
End: 2025-08-01
Payer: MEDICARE

## 2025-08-01 ENCOUNTER — APPOINTMENT (OUTPATIENT)
Dept: INFECTIOUS DISEASE | Facility: CLINIC | Age: 75
End: 2025-08-01
Payer: MEDICARE

## 2025-08-01 VITALS
SYSTOLIC BLOOD PRESSURE: 109 MMHG | DIASTOLIC BLOOD PRESSURE: 64 MMHG | BODY MASS INDEX: 18.36 KG/M2 | TEMPERATURE: 97.5 F | WEIGHT: 117 LBS | OXYGEN SATURATION: 96 % | RESPIRATION RATE: 16 BRPM | HEART RATE: 88 BPM | HEIGHT: 67.01 IN

## 2025-08-01 VITALS
HEART RATE: 71 BPM | OXYGEN SATURATION: 96 % | HEIGHT: 67.01 IN | TEMPERATURE: 97.8 F | WEIGHT: 117 LBS | BODY MASS INDEX: 18.36 KG/M2 | SYSTOLIC BLOOD PRESSURE: 127 MMHG | DIASTOLIC BLOOD PRESSURE: 71 MMHG

## 2025-08-01 DIAGNOSIS — D16.5 BENIGN NEOPLASM OF LOWER JAW BONE: ICD-10-CM

## 2025-08-01 LAB
ALBUMIN SERPL ELPH-MCNC: 4.6 G/DL
ALP BLD-CCNC: 74 U/L
ALT SERPL-CCNC: 23 U/L
ANION GAP SERPL CALC-SCNC: 15 MMOL/L
AST SERPL-CCNC: 21 U/L
BILIRUB SERPL-MCNC: 0.4 MG/DL
BUN SERPL-MCNC: 20 MG/DL
CALCIUM SERPL-MCNC: 9 MG/DL
CEA SERPL-MCNC: 5.4 NG/ML
CHLORIDE SERPL-SCNC: 103 MMOL/L
CO2 SERPL-SCNC: 21 MMOL/L
CREAT SERPL-MCNC: 0.67 MG/DL
EGFRCR SERPLBLD CKD-EPI 2021: 91 ML/MIN/1.73M2
GLUCOSE SERPL-MCNC: 93 MG/DL
POTASSIUM SERPL-SCNC: 4.8 MMOL/L
PROT SERPL-MCNC: 7.1 G/DL
SODIUM SERPL-SCNC: 139 MMOL/L
TSH SERPL-ACNC: 0.95 UIU/ML

## 2025-08-01 PROCEDURE — 99213 OFFICE O/P EST LOW 20 MIN: CPT

## 2025-08-01 PROCEDURE — G2211 COMPLEX E/M VISIT ADD ON: CPT

## 2025-08-01 PROCEDURE — 99205 OFFICE O/P NEW HI 60 MIN: CPT

## 2025-08-01 RX ORDER — DABRAFENIB 75 MG/1
75 CAPSULE ORAL
Qty: 120 | Refills: 2 | Status: ACTIVE | COMMUNITY
Start: 2025-08-01 | End: 1900-01-01

## 2025-08-01 RX ORDER — TRAMETINIB 0.5 MG/1
0.5 TABLET, FILM COATED ORAL DAILY
Qty: 60 | Refills: 2 | Status: ACTIVE | COMMUNITY
Start: 2025-08-01 | End: 1900-01-01

## 2025-08-03 LAB
HAV IGM SER QL: NONREACTIVE
HBV CORE IGM SER QL: NONREACTIVE
HBV SURFACE AG SER QL: NONREACTIVE
HCV AB SER QL: NONREACTIVE
HCV S/CO RATIO: 0.08 S/CO

## 2025-08-19 ENCOUNTER — APPOINTMENT (OUTPATIENT)
Dept: CT IMAGING | Facility: IMAGING CENTER | Age: 75
End: 2025-08-19
Payer: MEDICARE

## 2025-08-19 ENCOUNTER — APPOINTMENT (OUTPATIENT)
Dept: MRI IMAGING | Facility: IMAGING CENTER | Age: 75
End: 2025-08-19
Payer: MEDICARE

## 2025-08-19 ENCOUNTER — OUTPATIENT (OUTPATIENT)
Dept: OUTPATIENT SERVICES | Facility: HOSPITAL | Age: 75
LOS: 1 days | End: 2025-08-19
Payer: MEDICARE

## 2025-08-19 DIAGNOSIS — D16.5 BENIGN NEOPLASM OF LOWER JAW BONE: ICD-10-CM

## 2025-08-19 DIAGNOSIS — H26.9 UNSPECIFIED CATARACT: Chronic | ICD-10-CM

## 2025-08-19 PROCEDURE — 70543 MRI ORBT/FAC/NCK W/O &W/DYE: CPT | Mod: 26

## 2025-08-19 PROCEDURE — 71260 CT THORAX DX C+: CPT | Mod: 26

## 2025-08-19 PROCEDURE — A9585: CPT

## 2025-08-19 PROCEDURE — 71260 CT THORAX DX C+: CPT

## 2025-08-19 PROCEDURE — 70543 MRI ORBT/FAC/NCK W/O &W/DYE: CPT

## 2025-08-20 DIAGNOSIS — Z14.1 CYSTIC FIBROSIS CARRIER: ICD-10-CM

## 2025-08-22 ENCOUNTER — APPOINTMENT (OUTPATIENT)
Dept: HEMATOLOGY ONCOLOGY | Facility: CLINIC | Age: 75
End: 2025-08-22
Payer: MEDICARE

## 2025-08-22 ENCOUNTER — APPOINTMENT (OUTPATIENT)
Dept: HEMATOLOGY ONCOLOGY | Facility: CLINIC | Age: 75
End: 2025-08-22

## 2025-08-22 DIAGNOSIS — D16.5 BENIGN NEOPLASM OF LOWER JAW BONE: ICD-10-CM

## 2025-08-22 DIAGNOSIS — A31.0 PULMONARY MYCOBACTERIAL INFECTION: ICD-10-CM

## 2025-08-22 PROCEDURE — 99213 OFFICE O/P EST LOW 20 MIN: CPT | Mod: 93

## 2025-09-09 ENCOUNTER — APPOINTMENT (OUTPATIENT)
Age: 75
End: 2025-09-09
Payer: MEDICARE

## 2025-09-09 VITALS
RESPIRATION RATE: 17 BRPM | HEART RATE: 83 BPM | SYSTOLIC BLOOD PRESSURE: 135 MMHG | OXYGEN SATURATION: 94 % | BODY MASS INDEX: 17.42 KG/M2 | WEIGHT: 111 LBS | HEIGHT: 67 IN | DIASTOLIC BLOOD PRESSURE: 78 MMHG

## 2025-09-09 DIAGNOSIS — M41.9 SCOLIOSIS, UNSPECIFIED: ICD-10-CM

## 2025-09-09 DIAGNOSIS — J31.0 CHRONIC RHINITIS: ICD-10-CM

## 2025-09-09 DIAGNOSIS — Z14.1 CYSTIC FIBROSIS CARRIER: ICD-10-CM

## 2025-09-09 DIAGNOSIS — A31.0 PULMONARY MYCOBACTERIAL INFECTION: ICD-10-CM

## 2025-09-09 DIAGNOSIS — D16.5 BENIGN NEOPLASM OF LOWER JAW BONE: ICD-10-CM

## 2025-09-09 DIAGNOSIS — J47.9 BRONCHIECTASIS, UNCOMPLICATED: ICD-10-CM

## 2025-09-09 PROCEDURE — 99215 OFFICE O/P EST HI 40 MIN: CPT

## 2025-09-15 ENCOUNTER — APPOINTMENT (OUTPATIENT)
Dept: RHEUMATOLOGY | Facility: CLINIC | Age: 75
End: 2025-09-15
Payer: MEDICARE

## 2025-09-15 ENCOUNTER — APPOINTMENT (OUTPATIENT)
Dept: INFECTIOUS DISEASE | Facility: CLINIC | Age: 75
End: 2025-09-15
Payer: MEDICARE

## 2025-09-15 VITALS
OXYGEN SATURATION: 95 % | BODY MASS INDEX: 17.42 KG/M2 | SYSTOLIC BLOOD PRESSURE: 158 MMHG | TEMPERATURE: 98.2 F | WEIGHT: 111 LBS | HEART RATE: 102 BPM | DIASTOLIC BLOOD PRESSURE: 83 MMHG | HEIGHT: 67 IN

## 2025-09-15 VITALS
HEIGHT: 67 IN | SYSTOLIC BLOOD PRESSURE: 146 MMHG | HEART RATE: 92 BPM | BODY MASS INDEX: 17.42 KG/M2 | DIASTOLIC BLOOD PRESSURE: 81 MMHG | WEIGHT: 111 LBS | OXYGEN SATURATION: 96 %

## 2025-09-15 DIAGNOSIS — M81.0 AGE-RELATED OSTEOPOROSIS W/OUT CURRENT PATHOLOGICAL FRACTURE: ICD-10-CM

## 2025-09-15 LAB
ALBUMIN SERPL ELPH-MCNC: 4.9 G/DL
ALP BLD-CCNC: 84 U/L
ALT SERPL-CCNC: 32 U/L
ANION GAP SERPL CALC-SCNC: 13 MMOL/L
AST SERPL-CCNC: 28 U/L
BASOPHILS # BLD AUTO: 0.03 K/UL
BASOPHILS NFR BLD AUTO: 0.6 %
BILIRUB SERPL-MCNC: 0.5 MG/DL
BUN SERPL-MCNC: 18 MG/DL
CALCIUM SERPL-MCNC: 9.9 MG/DL
CHLORIDE SERPL-SCNC: 99 MMOL/L
CO2 SERPL-SCNC: 25 MMOL/L
CREAT SERPL-MCNC: 0.68 MG/DL
EGFRCR SERPLBLD CKD-EPI 2021: 91 ML/MIN/1.73M2
EOSINOPHIL # BLD AUTO: 0.1 K/UL
EOSINOPHIL NFR BLD AUTO: 1.9 %
GLUCOSE SERPL-MCNC: 106 MG/DL
HCT VFR BLD CALC: 45.7 %
HGB BLD-MCNC: 14.6 G/DL
IMM GRANULOCYTES NFR BLD AUTO: 0.4 %
LYMPHOCYTES # BLD AUTO: 0.69 K/UL
LYMPHOCYTES NFR BLD AUTO: 13.2 %
MAN DIFF?: NORMAL
MCHC RBC-ENTMCNC: 30.6 PG
MCHC RBC-ENTMCNC: 31.9 G/DL
MCV RBC AUTO: 95.8 FL
MONOCYTES # BLD AUTO: 0.46 K/UL
MONOCYTES NFR BLD AUTO: 8.8 %
NEUTROPHILS # BLD AUTO: 3.94 K/UL
NEUTROPHILS NFR BLD AUTO: 75.1 %
PLATELET # BLD AUTO: 167 K/UL
POTASSIUM SERPL-SCNC: 5.8 MMOL/L
PROT SERPL-MCNC: 7.5 G/DL
RBC # BLD: 4.77 M/UL
RBC # FLD: 15 %
SODIUM SERPL-SCNC: 136 MMOL/L
WBC # FLD AUTO: 5.24 K/UL

## 2025-09-15 PROCEDURE — G2211 COMPLEX E/M VISIT ADD ON: CPT

## 2025-09-15 PROCEDURE — 96372 THER/PROPH/DIAG INJ SC/IM: CPT

## 2025-09-15 PROCEDURE — 99213 OFFICE O/P EST LOW 20 MIN: CPT

## 2025-09-15 PROCEDURE — 99213 OFFICE O/P EST LOW 20 MIN: CPT | Mod: 25

## 2025-09-16 RX ADMIN — DENOSUMAB 0 MG/ML: 60 INJECTION SUBCUTANEOUS at 00:00

## 2025-09-17 LAB — 25(OH)D3 SERPL-MCNC: 160 NG/ML

## 2025-09-17 RX ORDER — DENOSUMAB 60 MG/ML
60 INJECTION SUBCUTANEOUS
Qty: 1 | Refills: 0 | Status: COMPLETED | OUTPATIENT
Start: 2025-09-16

## (undated) DEVICE — DRSG 2X2

## (undated) DEVICE — CATH IV SAFE BC 22G X 1" (BLUE)

## (undated) DEVICE — CONTAINER FORMALIN 80ML YELLOW

## (undated) DEVICE — BIOPSY FORCEP RADIAL JAW 4 STANDARD WITH NEEDLE

## (undated) DEVICE — LINE BREATHE SAMPLNG

## (undated) DEVICE — SALIVA EJECTOR (BLUE)

## (undated) DEVICE — PACK IV START WITH CHG

## (undated) DEVICE — LUBRICATING JELLY HR ONE SHOT 3G

## (undated) DEVICE — DRSG CURITY GAUZE SPONGE 4 X 4" 12-PLY NON-STERILE

## (undated) DEVICE — ELCTR ECG CONDUCTIVE ADHESIVE

## (undated) DEVICE — TUBING IV SET GRAVITY 3Y 100" MACRO

## (undated) DEVICE — BASIN EMESIS 10IN GRADUATED MAUVE

## (undated) DEVICE — BIOPSY FORCEP COLD DISP

## (undated) DEVICE — ELCTR GROUNDING PAD ADULT COVIDIEN

## (undated) DEVICE — TUBING MEDI-VAC W MAXIGRIP CONNECTORS 1/4"X6'

## (undated) DEVICE — DRSG BANDAID 0.75X3"

## (undated) DEVICE — GOWN LG

## (undated) DEVICE — FACESHIELD FULL VISOR

## (undated) DEVICE — TUBING SUCTION NONCONDUCTIVE 6MM X 12FT